# Patient Record
Sex: FEMALE | Race: WHITE | Employment: FULL TIME | URBAN - METROPOLITAN AREA
[De-identification: names, ages, dates, MRNs, and addresses within clinical notes are randomized per-mention and may not be internally consistent; named-entity substitution may affect disease eponyms.]

---

## 2014-08-26 LAB — HIV AG/AB: NONREACTIVE

## 2018-03-16 ENCOUNTER — HOSPITAL ENCOUNTER (OUTPATIENT)
Age: 38
Setting detail: SPECIMEN
Discharge: HOME | End: 2018-03-16
Payer: COMMERCIAL

## 2018-03-16 LAB
DIRECT EXAM: NORMAL
Lab: NORMAL
SPECIMEN DESCRIPTION: NORMAL
STATUS: NORMAL

## 2018-08-05 ENCOUNTER — OFFICE VISIT (OUTPATIENT)
Dept: FAMILY MEDICINE CLINIC | Age: 38
End: 2018-08-05
Payer: COMMERCIAL

## 2018-08-05 VITALS
DIASTOLIC BLOOD PRESSURE: 66 MMHG | HEART RATE: 87 BPM | SYSTOLIC BLOOD PRESSURE: 104 MMHG | TEMPERATURE: 98.8 F | OXYGEN SATURATION: 95 % | BODY MASS INDEX: 42.74 KG/M2 | HEIGHT: 68 IN | WEIGHT: 282 LBS

## 2018-08-05 DIAGNOSIS — L02.411 ABSCESS OF AXILLA, RIGHT: Primary | ICD-10-CM

## 2018-08-05 DIAGNOSIS — L02.91 ENCOUNTER FOR DRAINAGE OF ABSCESS: ICD-10-CM

## 2018-08-05 PROCEDURE — 99204 OFFICE O/P NEW MOD 45 MIN: CPT | Performed by: NURSE PRACTITIONER

## 2018-08-05 RX ORDER — LIRAGLUTIDE 6 MG/ML
3 INJECTION, SOLUTION SUBCUTANEOUS DAILY
COMMUNITY
Start: 2018-06-29 | End: 2019-01-21

## 2018-08-05 RX ORDER — LEVOTHYROXINE SODIUM 0.12 MG/1
1 TABLET ORAL DAILY
COMMUNITY
Start: 2018-06-29

## 2018-08-05 RX ORDER — LIDOCAINE HYDROCHLORIDE 10 MG/ML
1 INJECTION, SOLUTION EPIDURAL; INFILTRATION; INTRACAUDAL; PERINEURAL ONCE
Status: COMPLETED | OUTPATIENT
Start: 2018-08-05 | End: 2018-08-05

## 2018-08-05 RX ADMIN — LIDOCAINE HYDROCHLORIDE 1 ML: 10 INJECTION, SOLUTION EPIDURAL; INFILTRATION; INTRACAUDAL; PERINEURAL at 13:34

## 2018-08-05 NOTE — PROGRESS NOTES
Lewis Robertson, FN-C    Esperanza Oliva is a 45 y.o. female who is here with c/o of   Chief Complaint   Patient presents with    Cyst     in rt axila - noticed it on Friday       Patient Accompanied by: patient    HPI - Esperanza Oliva is here today with c/o lesion to her right arm pit. She states that she noticed it 3-4 days ago. She denies associated symptoms including pain, fever/chills, redness, itching. She has tried warm compresses. They have not helped. She has had a similar problem in the past with a lesion on her inner thigh that needed to be drained. She states that she is leaving to go to [facility] and doesn't want to be away from home with a problem. There are no aggravating or relieving factors. Patient Active Problem List:     Hypothyroidism     Lumbago     Displacement of lumbar intervertebral disc without myelopathy     Past Medical History:   Diagnosis Date    Thyroid disease       Past Surgical History:   Procedure Laterality Date    KNEE SURGERY      NERVE BLOCK  01-13-15    caudal # 1,decadron 10 mg    NERVE BLOCK  1-20-15    caudal epidural steroid block #2 decadron 10 mg    NERVE BLOCK  1/27/15    caudal #3, decadron 10mg     No family history on file. Current Outpatient Prescriptions   Medication Sig Dispense Refill    levothyroxine (SYNTHROID) 125 MCG tablet Take 1 tablet by mouth daily      SAXENDA 18 MG/3ML SOPN Inject 3 mg as directed daily      SPRINTEC 28 0.25-35 MG-MCG per tablet       Multiple Vitamins-Minerals (MULTI COMPLETE PO) Take 1 tablet by mouth daily.  ibuprofen (ADVIL;MOTRIN) 800 MG tablet Take 800 mg by mouth every 6 hours as needed for Pain. No current facility-administered medications for this visit. ALLERGIES:  No Known Allergies    Social History   Substance Use Topics    Smoking status: Never Smoker    Smokeless tobacco: Never Used    Alcohol use No      Comment: socially      Body mass index is 42.88 kg/m².   /66 (Site: Left Arm, Position: Sitting, Cuff Size: Medium Adult)   Pulse 87   Temp 98.8 °F (37.1 °C) (Oral)   Ht 5' 8\" (1.727 m)   Wt 282 lb (127.9 kg)   LMP 07/11/2018 (Approximate)   SpO2 95%   BMI 42.88 kg/m²     Subjective     · Constitutional:  Negative for activity change, appetite change, chills, fatigue, fever and unexpected weight change. · HENT: Negative for congestion, ear pain, rhinorrhea, sinus pain, sinus pressure and sore throat. · Eyes:  Negative for pain and discharge. · Respiratory:  Negative for cough, chest tightness, shortness of breath and wheezing. · Cardiovascular:  Negative for chest pain, palpitations and leg swelling. · Gastrointestinal: Negative for abdominal pain, blood in stool, constipation,diarrhea, nausea and vomiting. · Endocrine: Negative for cold intolerance, heat intolerance, polydipsia, polyphagia and polyuria. · Genitourinary: Negative for difficulty urinating, dysuria, flank pain, frequency, hematuria and urgency. · Musculoskeletal: Negative for arthralgias, back pain, joint swelling, myalgias, neck pain and neck stiffness. · Skin: Negative for rash and wound. POSITIVE skin lesion right arm pit  · Allergic/Immunologic: Negative for environmental allergies and food allergies. · Neurological:  Negative for dizziness, light-headedness, numbness and headaches. · Hematological:  Negative for adenopathy. Does not bruise/bleed easily. · Psychiatric/Behavioral: Negative for self-injury, sleep disturbance and suicidal ideas. Objective     PHYSICAL EXAM:   · Constitutional: Nora Reyes is oriented to person, place, and time. Vital signs are normal. Appears well-developed and well-nourished. · HEENT:   · Head: Normocephalic and atraumatic. Right Ear: Hearing and external ear normal.   Left Ear: Hearing and external ear normal.   · Nose: Nose normal.   · Eyes:PERRL, EOMI, Conjunctiva normal, No discharge.     · Neck: Trachea normal, full passive range of adhesive bandage  · Advised her to use warm compresses to keep the area draining  · Advised her to f/u with primary if she notices signs/symptoms of infection (redness, swelling, heat)    1. Donte Dumont received counseling on the following healthy behaviors: nutrition, exercise and medication adherence  2. Patient given educational materials - see patient instructions  3. Was a self-tracking handout given in paper form or via [facility]? No  If yes, see orders or list here. 4.  Discussed use, benefit, and side effects of prescribed medications. Barriers to medication compliance addressed. All patient questions answered. Pt voiced understanding. 5.  Reviewed prior labs, imaging, consultation, follow up, and health maintenance  6. Continue current medications, diet and exercise. 7. Discussed use, benefit, and side effects of prescribed medications. Barriers to medication compliance addressed. All her questions were answered. Pt voiced understanding. Donte Dumont will continue current medications, diet and exercise. No Follow-up on file. No orders of the defined types were placed in this encounter. Completed Orders/Prescriptions   No orders of the defined types were placed in this encounter. Outpatient Encounter Prescriptions as of 8/5/2018   Medication Sig Dispense Refill    levothyroxine (SYNTHROID) 125 MCG tablet Take 1 tablet by mouth daily      SAXENDA 18 MG/3ML SOPN Inject 3 mg as directed daily      [DISCONTINUED] levothyroxine (SYNTHROID) 100 MCG tablet TAKE ONE TABLET BY MOUTH DAILY 30 tablet 0    SPRINTEC 28 0.25-35 MG-MCG per tablet       Multiple Vitamins-Minerals (MULTI COMPLETE PO) Take 1 tablet by mouth daily.  ibuprofen (ADVIL;MOTRIN) 800 MG tablet Take 800 mg by mouth every 6 hours as needed for Pain.       [DISCONTINUED] levothyroxine (SYNTHROID) 100 MCG tablet TAKE ONE TABLET BY MOUTH EVERY DAY 30 tablet 5     No facility-administered encounter medications on file as of

## 2019-01-21 ENCOUNTER — OFFICE VISIT (OUTPATIENT)
Dept: FAMILY MEDICINE CLINIC | Age: 39
End: 2019-01-21
Payer: COMMERCIAL

## 2019-01-21 VITALS
TEMPERATURE: 97.8 F | HEART RATE: 89 BPM | HEIGHT: 68 IN | BODY MASS INDEX: 43.35 KG/M2 | OXYGEN SATURATION: 99 % | WEIGHT: 286 LBS | RESPIRATION RATE: 18 BRPM | DIASTOLIC BLOOD PRESSURE: 66 MMHG | SYSTOLIC BLOOD PRESSURE: 110 MMHG

## 2019-01-21 DIAGNOSIS — J40 BRONCHITIS: Primary | ICD-10-CM

## 2019-01-21 PROCEDURE — 99214 OFFICE O/P EST MOD 30 MIN: CPT | Performed by: NURSE PRACTITIONER

## 2019-01-21 RX ORDER — PHENTERMINE HYDROCHLORIDE 37.5 MG/1
TABLET ORAL
COMMUNITY
Start: 2019-01-04 | End: 2022-03-15 | Stop reason: ALTCHOICE

## 2019-01-21 RX ORDER — AZITHROMYCIN 250 MG/1
TABLET, FILM COATED ORAL
Qty: 1 PACKET | Refills: 0 | Status: SHIPPED | OUTPATIENT
Start: 2019-01-21 | End: 2022-03-15 | Stop reason: ALTCHOICE

## 2019-01-21 RX ORDER — PREDNISONE 20 MG/1
40 TABLET ORAL DAILY
Qty: 10 TABLET | Refills: 0 | Status: SHIPPED | OUTPATIENT
Start: 2019-01-21 | End: 2019-01-26

## 2019-01-21 ASSESSMENT — ENCOUNTER SYMPTOMS
CHEST TIGHTNESS: 0
EYE REDNESS: 0
SINUS PRESSURE: 0
EYE DISCHARGE: 0
SHORTNESS OF BREATH: 0
VOICE CHANGE: 0
COUGH: 1
WHEEZING: 1
SORE THROAT: 1

## 2020-05-02 ENCOUNTER — OFFICE VISIT (OUTPATIENT)
Dept: FAMILY MEDICINE CLINIC | Age: 40
End: 2020-05-02
Payer: COMMERCIAL

## 2020-05-02 VITALS
HEART RATE: 79 BPM | OXYGEN SATURATION: 97 % | RESPIRATION RATE: 16 BRPM | DIASTOLIC BLOOD PRESSURE: 78 MMHG | SYSTOLIC BLOOD PRESSURE: 130 MMHG

## 2020-05-02 PROCEDURE — 99213 OFFICE O/P EST LOW 20 MIN: CPT | Performed by: PHYSICIAN ASSISTANT

## 2020-05-02 RX ORDER — IBUPROFEN 800 MG/1
800 TABLET ORAL EVERY 6 HOURS PRN
Qty: 28 TABLET | Refills: 0 | Status: SHIPPED | OUTPATIENT
Start: 2020-05-02 | End: 2020-05-09

## 2020-05-02 RX ORDER — PREDNISONE 20 MG/1
20 TABLET ORAL 2 TIMES DAILY
Qty: 10 TABLET | Refills: 0 | Status: SHIPPED | OUTPATIENT
Start: 2020-05-02 | End: 2020-05-07

## 2020-05-02 NOTE — PATIENT INSTRUCTIONS
you can put on your leg. Use a cane, crutches, or a walker as instructed. · Follow your doctor's instructions about activity during your healing process. If you can do mild exercise, slowly increase your activity. · Reach and stay at a healthy weight. Extra weight can strain the joints, especially the knees and hips, and make the pain worse. Losing even a few pounds may help. When should you call for help? Call 911 anytime you think you may need emergency care. For example, call if:    · You have symptoms of a blood clot in your lung (called a pulmonary embolism). These may include:  ? Sudden chest pain. ? Trouble breathing. ? Coughing up blood.    Call your doctor now or seek immediate medical care if:    · You have severe or increasing pain.     · Your leg or foot turns cold or changes color.     · You cannot stand or put weight on your knee.     · Your knee looks twisted or bent out of shape.     · You cannot move your knee.     · You have signs of infection, such as:  ? Increased pain, swelling, warmth, or redness. ? Red streaks leading from the knee. ? Pus draining from a place on your knee. ? A fever.     · You have signs of a blood clot in your leg (called a deep vein thrombosis), such as:  ? Pain in your calf, back of the knee, thigh, or groin. ? Redness and swelling in your leg or groin.    Watch closely for changes in your health, and be sure to contact your doctor if:    · You have tingling, weakness, or numbness in your knee.     · You have any new symptoms, such as swelling.     · You have bruises from a knee injury that last longer than 2 weeks.     · You do not get better as expected. Where can you learn more? Go to [email or web address] org and sign in to your [employer] account. Enter K195 in the [employer] box to learn more about \"Knee Pain or Injury: Care Instructions. \"     If you do not have an account, please click on the \"Sign Up Now\" link.   Current as of:

## 2020-05-02 NOTE — PROGRESS NOTES
of the review of systems was reviewed andnegative. PAST MEDICAL HISTORY   History reviewed. Past Medical History:   Diagnosis Date    Thyroid disease          SURGICAL HISTORY     History reviewed. Past Surgical History:   Procedure Laterality Date    KNEE SURGERY      NERVE BLOCK  01-13-15    caudal # 1,decadron 10 mg    NERVE BLOCK  1-20-15    caudal epidural steroid block #2 decadron 10 mg    NERVE BLOCK  1/27/15    caudal #3, decadron 10mg         CURRENT MEDICATIONS       Current Outpatient Medications   Medication Sig Dispense Refill    ibuprofen (ADVIL;MOTRIN) 800 MG tablet Take 1 tablet by mouth every 6 hours as needed for Pain 28 tablet 0    predniSONE (DELTASONE) 20 MG tablet Take 1 tablet by mouth 2 times daily for 5 days 10 tablet 0    levothyroxine (SYNTHROID) 125 MCG tablet Take 1 tablet by mouth daily      SPRINTEC 28 0.25-35 MG-MCG per tablet       Multiple Vitamins-Minerals (MULTI COMPLETE PO) Take 1 tablet by mouth daily.  phentermine (ADIPEX-P) 37.5 MG tablet       azithromycin (ZITHROMAX Z-JOSE) 250 MG tablet Take 2 tabs on day 1 followed by 1 tab on days 2-5. (Patient not taking: Reported on 5/2/2020) 1 packet 0     No current facility-administered medications for this visit. ALLERGIES     Patient has no known allergies. FAMILY HISTORY           Problem Relation Age of Onset    No Known Problems Mother      Family Status   Relation Name Status    Mother  Alive          SOCIAL HISTORY      reports that she has never smoked. She has never used smokeless tobacco. She reports that she does not drink alcohol or use drugs. PHYSICAL EXAM    (up to 7 for level 4, 8 or more for level 5)     Vitals:    05/02/20 1519   BP: 130/78   Pulse: 79   Resp: 16   SpO2: 97%         Physical Exam  Vitals signs and nursing note reviewed. Constitutional:       General: She is not in acute distress. Appearance: Normal appearance.  She is not ill-appearing, Custom]   - Future Order         ORDERS WITHOUT AN ASSOCIATED DIAGNOSIS    ibuprofen (ADVIL;MOTRIN) 800 MG tablet [3845]      predniSONE (DELTASONE) 20 MG tablet [6496]              PLAN     Return if symptoms worsen or fail to improve. DISCHARGEMEDICATIONS:  Orders Placed This Encounter   Medications    ibuprofen (ADVIL;MOTRIN) 800 MG tablet     Sig: Take 1 tablet by mouth every 6 hours as needed for Pain     Dispense:  28 tablet     Refill:  0    predniSONE (DELTASONE) 20 MG tablet     Sig: Take 1 tablet by mouth 2 times daily for 5 days     Dispense:  10 tablet     Refill:  0         Plan:  1. RICE Therapy  2. Tylenol/Motrin for pain as needed  3. Follow up with PCP   4. X-ray as ordered - possible treatment alteration based on results. Patient instructed to return to the office if symptoms worsen, return, or have any other concerns. Patient understands and is agreeable.          Melodie Jeronimo PA-C 5/6/2020 10:03 AM

## 2020-05-06 ASSESSMENT — ENCOUNTER SYMPTOMS
GASTROINTESTINAL NEGATIVE: 1
RESPIRATORY NEGATIVE: 1

## 2022-03-15 ENCOUNTER — ANESTHESIA EVENT (OUTPATIENT)
Dept: OPERATING ROOM | Age: 42
DRG: 418 | End: 2022-03-15
Payer: COMMERCIAL

## 2022-03-15 ENCOUNTER — APPOINTMENT (OUTPATIENT)
Dept: MRI IMAGING | Age: 42
DRG: 418 | End: 2022-03-15
Payer: COMMERCIAL

## 2022-03-15 ENCOUNTER — HOSPITAL ENCOUNTER (INPATIENT)
Age: 42
LOS: 1 days | Discharge: HOME | DRG: 418 | End: 2022-03-16
Attending: EMERGENCY MEDICINE | Admitting: FAMILY MEDICINE
Payer: COMMERCIAL

## 2022-03-15 ENCOUNTER — APPOINTMENT (OUTPATIENT)
Dept: ULTRASOUND IMAGING | Age: 42
DRG: 418 | End: 2022-03-15
Payer: COMMERCIAL

## 2022-03-15 DIAGNOSIS — K81.0 ACUTE CHOLECYSTITIS: Primary | ICD-10-CM

## 2022-03-15 DIAGNOSIS — K80.01 CALCULUS OF GALLBLADDER WITH ACUTE CHOLECYSTITIS AND OBSTRUCTION: ICD-10-CM

## 2022-03-15 PROBLEM — R79.89 ELEVATED LFTS: Status: ACTIVE | Noted: 2022-03-15

## 2022-03-15 LAB
-: ABNORMAL
ABSOLUTE EOS #: 0.05 K/UL (ref 0–0.44)
ABSOLUTE IMMATURE GRANULOCYTE: 0.05 K/UL (ref 0–0.3)
ABSOLUTE LYMPH #: 2.54 K/UL (ref 1.1–3.7)
ABSOLUTE MONO #: 0.93 K/UL (ref 0.1–1.2)
ALBUMIN SERPL-MCNC: 4.3 G/DL (ref 3.5–5.2)
ALP BLD-CCNC: 156 U/L (ref 35–104)
ALT SERPL-CCNC: 211 U/L (ref 5–33)
AMORPHOUS: ABNORMAL
ANION GAP SERPL CALCULATED.3IONS-SCNC: 13 MMOL/L (ref 9–17)
AST SERPL-CCNC: 272 U/L
BACTERIA: ABNORMAL
BASOPHILS # BLD: 1 % (ref 0–2)
BASOPHILS ABSOLUTE: 0.05 K/UL (ref 0–0.2)
BILIRUB SERPL-MCNC: 1.91 MG/DL (ref 0.3–1.2)
BILIRUBIN DIRECT: 1.3 MG/DL
BILIRUBIN URINE: ABNORMAL
BILIRUBIN, INDIRECT: 0.61 MG/DL (ref 0–1)
BUN BLDV-MCNC: 18 MG/DL (ref 6–20)
BUN/CREAT BLD: 22 (ref 9–20)
CALCIUM SERPL-MCNC: 9.3 MG/DL (ref 8.6–10.4)
CHLORIDE BLD-SCNC: 103 MMOL/L (ref 98–107)
CO2: 23 MMOL/L (ref 20–31)
COLOR: ABNORMAL
CREAT SERPL-MCNC: 0.82 MG/DL (ref 0.5–0.9)
EOSINOPHILS RELATIVE PERCENT: 1 % (ref 1–4)
EPITHELIAL CELLS UA: ABNORMAL /HPF (ref 0–5)
GFR AFRICAN AMERICAN: >60 ML/MIN
GFR NON-AFRICAN AMERICAN: >60 ML/MIN
GFR SERPL CREATININE-BSD FRML MDRD: ABNORMAL ML/MIN/{1.73_M2}
GLUCOSE BLD-MCNC: 112 MG/DL (ref 70–99)
GLUCOSE BLD-MCNC: 89 MG/DL (ref 65–105)
GLUCOSE URINE: NEGATIVE
HAV IGM SER IA-ACNC: NONREACTIVE
HCG QUALITATIVE: NEGATIVE
HCT VFR BLD CALC: 41 % (ref 36.3–47.1)
HEMOGLOBIN: 13.5 G/DL (ref 11.9–15.1)
HEPATITIS B CORE IGM ANTIBODY: NONREACTIVE
HEPATITIS B SURFACE ANTIGEN: NONREACTIVE
HEPATITIS C ANTIBODY: NONREACTIVE
IMMATURE GRANULOCYTES: 1 %
KETONES, URINE: NEGATIVE
LEUKOCYTE ESTERASE, URINE: NEGATIVE
LIPASE: 27 U/L (ref 13–60)
LYMPHOCYTES # BLD: 24 % (ref 24–43)
MCH RBC QN AUTO: 29.2 PG (ref 25.2–33.5)
MCHC RBC AUTO-ENTMCNC: 32.9 G/DL (ref 28.4–34.8)
MCV RBC AUTO: 88.6 FL (ref 82.6–102.9)
MONOCYTES # BLD: 9 % (ref 3–12)
NITRITE, URINE: NEGATIVE
NRBC AUTOMATED: 0 PER 100 WBC
PDW BLD-RTO: 12 % (ref 11.8–14.4)
PH UA: 6.5 (ref 5–8)
PLATELET # BLD: 272 K/UL (ref 138–453)
PMV BLD AUTO: 9.9 FL (ref 8.1–13.5)
POTASSIUM SERPL-SCNC: 4 MMOL/L (ref 3.7–5.3)
PROTEIN UA: NEGATIVE
RBC # BLD: 4.63 M/UL (ref 3.95–5.11)
RBC UA: ABNORMAL /HPF (ref 0–2)
SEG NEUTROPHILS: 64 % (ref 36–65)
SEGMENTED NEUTROPHILS ABSOLUTE COUNT: 6.96 K/UL (ref 1.5–8.1)
SODIUM BLD-SCNC: 139 MMOL/L (ref 135–144)
SPECIFIC GRAVITY UA: 1.02 (ref 1–1.03)
TOTAL PROTEIN: 6.7 G/DL (ref 6.4–8.3)
TURBIDITY: ABNORMAL
URINE HGB: NEGATIVE
UROBILINOGEN, URINE: NORMAL
WBC # BLD: 10.6 K/UL (ref 3.5–11.3)
WBC UA: ABNORMAL /HPF (ref 0–5)

## 2022-03-15 PROCEDURE — 2580000003 HC RX 258: Performed by: FAMILY MEDICINE

## 2022-03-15 PROCEDURE — G0378 HOSPITAL OBSERVATION PER HR: HCPCS

## 2022-03-15 PROCEDURE — 96365 THER/PROPH/DIAG IV INF INIT: CPT

## 2022-03-15 PROCEDURE — 83690 ASSAY OF LIPASE: CPT

## 2022-03-15 PROCEDURE — 2580000003 HC RX 258: Performed by: EMERGENCY MEDICINE

## 2022-03-15 PROCEDURE — 85025 COMPLETE CBC W/AUTO DIFF WBC: CPT

## 2022-03-15 PROCEDURE — 76705 ECHO EXAM OF ABDOMEN: CPT

## 2022-03-15 PROCEDURE — 6360000002 HC RX W HCPCS: Performed by: EMERGENCY MEDICINE

## 2022-03-15 PROCEDURE — 84703 CHORIONIC GONADOTROPIN ASSAY: CPT

## 2022-03-15 PROCEDURE — 80048 BASIC METABOLIC PNL TOTAL CA: CPT

## 2022-03-15 PROCEDURE — 81001 URINALYSIS AUTO W/SCOPE: CPT

## 2022-03-15 PROCEDURE — 96375 TX/PRO/DX INJ NEW DRUG ADDON: CPT

## 2022-03-15 PROCEDURE — 2580000003 HC RX 258: Performed by: STUDENT IN AN ORGANIZED HEALTH CARE EDUCATION/TRAINING PROGRAM

## 2022-03-15 PROCEDURE — 82947 ASSAY GLUCOSE BLOOD QUANT: CPT

## 2022-03-15 PROCEDURE — 99222 1ST HOSP IP/OBS MODERATE 55: CPT | Performed by: INTERNAL MEDICINE

## 2022-03-15 PROCEDURE — 80076 HEPATIC FUNCTION PANEL: CPT

## 2022-03-15 PROCEDURE — 96374 THER/PROPH/DIAG INJ IV PUSH: CPT

## 2022-03-15 PROCEDURE — 96372 THER/PROPH/DIAG INJ SC/IM: CPT

## 2022-03-15 PROCEDURE — 80074 ACUTE HEPATITIS PANEL: CPT

## 2022-03-15 PROCEDURE — 6360000002 HC RX W HCPCS: Performed by: FAMILY MEDICINE

## 2022-03-15 PROCEDURE — 1200000000 HC SEMI PRIVATE

## 2022-03-15 PROCEDURE — 74181 MRI ABDOMEN W/O CONTRAST: CPT

## 2022-03-15 PROCEDURE — 99283 EMERGENCY DEPT VISIT LOW MDM: CPT

## 2022-03-15 PROCEDURE — 6360000002 HC RX W HCPCS: Performed by: STUDENT IN AN ORGANIZED HEALTH CARE EDUCATION/TRAINING PROGRAM

## 2022-03-15 RX ORDER — SODIUM CHLORIDE 0.9 % (FLUSH) 0.9 %
5-40 SYRINGE (ML) INJECTION EVERY 12 HOURS SCHEDULED
Status: DISCONTINUED | OUTPATIENT
Start: 2022-03-15 | End: 2022-03-15 | Stop reason: SDUPTHER

## 2022-03-15 RX ORDER — SODIUM CHLORIDE 9 MG/ML
25 INJECTION, SOLUTION INTRAVENOUS PRN
Status: DISCONTINUED | OUTPATIENT
Start: 2022-03-15 | End: 2022-03-16 | Stop reason: HOSPADM

## 2022-03-15 RX ORDER — POLYETHYLENE GLYCOL 3350 17 G/17G
17 POWDER, FOR SOLUTION ORAL DAILY PRN
Status: DISCONTINUED | OUTPATIENT
Start: 2022-03-15 | End: 2022-03-16

## 2022-03-15 RX ORDER — MORPHINE SULFATE 4 MG/ML
4 INJECTION, SOLUTION INTRAMUSCULAR; INTRAVENOUS EVERY 4 HOURS PRN
Status: DISCONTINUED | OUTPATIENT
Start: 2022-03-15 | End: 2022-03-16 | Stop reason: HOSPADM

## 2022-03-15 RX ORDER — MAGNESIUM SULFATE 1 G/100ML
1000 INJECTION INTRAVENOUS PRN
Status: DISCONTINUED | OUTPATIENT
Start: 2022-03-15 | End: 2022-03-16 | Stop reason: HOSPADM

## 2022-03-15 RX ORDER — SODIUM CHLORIDE 0.9 % (FLUSH) 0.9 %
10 SYRINGE (ML) INJECTION PRN
Status: DISCONTINUED | OUTPATIENT
Start: 2022-03-15 | End: 2022-03-16 | Stop reason: HOSPADM

## 2022-03-15 RX ORDER — SODIUM CHLORIDE 9 MG/ML
INJECTION, SOLUTION INTRAVENOUS CONTINUOUS
Status: DISCONTINUED | OUTPATIENT
Start: 2022-03-15 | End: 2022-03-16

## 2022-03-15 RX ORDER — ONDANSETRON 4 MG/1
4 TABLET, ORALLY DISINTEGRATING ORAL EVERY 8 HOURS PRN
Status: DISCONTINUED | OUTPATIENT
Start: 2022-03-15 | End: 2022-03-16 | Stop reason: HOSPADM

## 2022-03-15 RX ORDER — POTASSIUM CHLORIDE 20 MEQ/1
40 TABLET, EXTENDED RELEASE ORAL PRN
Status: DISCONTINUED | OUTPATIENT
Start: 2022-03-15 | End: 2022-03-16 | Stop reason: HOSPADM

## 2022-03-15 RX ORDER — MORPHINE SULFATE 4 MG/ML
4 INJECTION, SOLUTION INTRAMUSCULAR; INTRAVENOUS ONCE
Status: COMPLETED | OUTPATIENT
Start: 2022-03-15 | End: 2022-03-15

## 2022-03-15 RX ORDER — SODIUM CHLORIDE 0.9 % (FLUSH) 0.9 %
5-40 SYRINGE (ML) INJECTION EVERY 12 HOURS SCHEDULED
Status: DISCONTINUED | OUTPATIENT
Start: 2022-03-15 | End: 2022-03-16 | Stop reason: HOSPADM

## 2022-03-15 RX ORDER — SODIUM CHLORIDE 9 MG/ML
25 INJECTION, SOLUTION INTRAVENOUS PRN
Status: DISCONTINUED | OUTPATIENT
Start: 2022-03-15 | End: 2022-03-15 | Stop reason: SDUPTHER

## 2022-03-15 RX ORDER — ONDANSETRON 2 MG/ML
4 INJECTION INTRAMUSCULAR; INTRAVENOUS ONCE
Status: COMPLETED | OUTPATIENT
Start: 2022-03-15 | End: 2022-03-15

## 2022-03-15 RX ORDER — CHLORAL HYDRATE 500 MG
1000 CAPSULE ORAL DAILY
COMMUNITY

## 2022-03-15 RX ORDER — CHOLECALCIFEROL (VITAMIN D3) 1250 MCG
1 CAPSULE ORAL DAILY
COMMUNITY

## 2022-03-15 RX ORDER — VITAMIN B COMPLEX
1 CAPSULE ORAL DAILY
COMMUNITY

## 2022-03-15 RX ORDER — ONDANSETRON 2 MG/ML
4 INJECTION INTRAMUSCULAR; INTRAVENOUS EVERY 6 HOURS PRN
Status: DISCONTINUED | OUTPATIENT
Start: 2022-03-15 | End: 2022-03-16 | Stop reason: HOSPADM

## 2022-03-15 RX ORDER — POTASSIUM CHLORIDE 7.45 MG/ML
10 INJECTION INTRAVENOUS PRN
Status: DISCONTINUED | OUTPATIENT
Start: 2022-03-15 | End: 2022-03-16 | Stop reason: HOSPADM

## 2022-03-15 RX ORDER — ACETAMINOPHEN 650 MG/1
650 SUPPOSITORY RECTAL EVERY 6 HOURS PRN
Status: DISCONTINUED | OUTPATIENT
Start: 2022-03-15 | End: 2022-03-16 | Stop reason: HOSPADM

## 2022-03-15 RX ORDER — SODIUM CHLORIDE 0.9 % (FLUSH) 0.9 %
5-40 SYRINGE (ML) INJECTION PRN
Status: DISCONTINUED | OUTPATIENT
Start: 2022-03-15 | End: 2022-03-15 | Stop reason: SDUPTHER

## 2022-03-15 RX ORDER — ACETAMINOPHEN 325 MG/1
650 TABLET ORAL EVERY 6 HOURS PRN
Status: DISCONTINUED | OUTPATIENT
Start: 2022-03-15 | End: 2022-03-16 | Stop reason: HOSPADM

## 2022-03-15 RX ADMIN — SODIUM CHLORIDE, PRESERVATIVE FREE 10 ML: 5 INJECTION INTRAVENOUS at 23:06

## 2022-03-15 RX ADMIN — PIPERACILLIN AND TAZOBACTAM 3375 MG: 3; .375 INJECTION, POWDER, LYOPHILIZED, FOR SOLUTION INTRAVENOUS at 23:56

## 2022-03-15 RX ADMIN — PIPERACILLIN AND TAZOBACTAM 4500 MG: 4; .5 INJECTION, POWDER, LYOPHILIZED, FOR SOLUTION INTRAVENOUS at 10:32

## 2022-03-15 RX ADMIN — MORPHINE SULFATE 4 MG: 4 INJECTION, SOLUTION INTRAMUSCULAR; INTRAVENOUS at 06:33

## 2022-03-15 RX ADMIN — PIPERACILLIN AND TAZOBACTAM 3375 MG: 3; .375 INJECTION, POWDER, LYOPHILIZED, FOR SOLUTION INTRAVENOUS at 16:20

## 2022-03-15 RX ADMIN — ONDANSETRON 4 MG: 2 INJECTION INTRAMUSCULAR; INTRAVENOUS at 06:31

## 2022-03-15 RX ADMIN — SODIUM CHLORIDE: 9 INJECTION, SOLUTION INTRAVENOUS at 22:58

## 2022-03-15 RX ADMIN — SODIUM CHLORIDE 25 ML: 9 INJECTION, SOLUTION INTRAVENOUS at 11:12

## 2022-03-15 RX ADMIN — ENOXAPARIN SODIUM 30 MG: 30 INJECTION SUBCUTANEOUS at 22:58

## 2022-03-15 ASSESSMENT — PAIN DESCRIPTION - ONSET: ONSET: ON-GOING

## 2022-03-15 ASSESSMENT — PAIN DESCRIPTION - PROGRESSION: CLINICAL_PROGRESSION: GRADUALLY IMPROVING

## 2022-03-15 ASSESSMENT — PAIN DESCRIPTION - LOCATION: LOCATION: ABDOMEN

## 2022-03-15 ASSESSMENT — PAIN DESCRIPTION - PAIN TYPE: TYPE: ACUTE PAIN

## 2022-03-15 ASSESSMENT — PAIN SCALES - GENERAL
PAINLEVEL_OUTOF10: 2
PAINLEVEL_OUTOF10: 9

## 2022-03-15 ASSESSMENT — PAIN - FUNCTIONAL ASSESSMENT: PAIN_FUNCTIONAL_ASSESSMENT: ACTIVITIES ARE NOT PREVENTED

## 2022-03-15 NOTE — CONSULTS
Gastroenterology Consult Note      Patient: Deandre Simmons  : 1980  Acct#:  [de-identified]     Date:  3/15/2022    Subjective:       History of Present Illness  Patient is a 39 y.o.  female admitted with Elevated LFTs [R79.89] who is seen in consult for elevated liver enzyme    This is a 41-year-old lady came to the emergency room complaining of right upper quadrant pain. Pain has been going on for a week. Intermittent sharp. Does not radiate. Pain sometimes after eating. No nausea no vomiting no bleeding no fever no chills 1 time of emesis last night which did not show any blood. Intermittent episode of diarrhea. The patient had elevated liver enzymes. Her gallbladder ultrasound showed cholelithiasis with sludge and enlarged common bile duct. Lipase is normal.  White blood count is normal.  LFTs are showing: Alkaline phosphatase of 156, , , bilirubin 1.9. Denied any other GI symptoms. Endoscopy history none            Past Medical History:   Diagnosis Date    Thyroid disease       Past Surgical History:   Procedure Laterality Date    KNEE SURGERY      NERVE BLOCK  01-13-15    caudal # 1,decadron 10 mg    NERVE BLOCK  1-20-15    caudal epidural steroid block #2 decadron 10 mg    NERVE BLOCK  1/27/15    caudal #3, decadron 10mg      Past Endoscopic History none    Admission Meds  No current facility-administered medications on file prior to encounter. Current Outpatient Medications on File Prior to Encounter   Medication Sig Dispense Refill    levothyroxine (SYNTHROID) 125 MCG tablet Take 1 tablet by mouth daily      SPRINTEC 28 0.25-35 MG-MCG per tablet       Multiple Vitamins-Minerals (MULTI COMPLETE PO) Take 1 tablet by mouth daily.       ibuprofen (ADVIL;MOTRIN) 800 MG tablet Take 1 tablet by mouth every 6 hours as needed for Pain 28 tablet 0       Patient   Does Use ASA, NSAID No  Allergies  No Known Allergies     Social   Social History     Tobacco Use    Smoking status: Never Smoker    Smokeless tobacco: Never Used   Substance Use Topics    Alcohol use: No     Comment: socially        PSYCH HISTORY:  Depression No  Anxiety No  Suicide No       Family History   Problem Relation Age of Onset    No Known Problems Mother       No family history of colon cancer, Crohn's disease, or ulcerative colitis. Review of Systems  Constitutional: negative  Eyes: negative  Ears, nose, mouth, throat, and face: negative  Respiratory: negative  Cardiovascular: negative  Gastrointestinal: negative  Genitourinary:negative  Integument/breast: negative  Hematologic/lymphatic: negative  Musculoskeletal:negative  Endocrine: negative           Physical Exam  Blood pressure (!) 155/97, pulse 78, temperature 98.4 °F (36.9 °C), resp. rate 16, height 5' 8\" (1.727 m), weight 275 lb 6.4 oz (124.9 kg), SpO2 97 %, not currently breastfeeding.          General Appearance: alert and oriented to person, place and time, well-developed and well-nourished, in no acute distress  Skin: warm and dry, no rash or erythema  Head: normocephalic and atraumatic  Eyes: pupils equal, round, and reactive to light, extraocular eye movements intact, conjunctivae normal  ENT: hearing grossly normal bilaterally  Neck: neck supple and non tender without mass, no thyromegaly or thyroid nodules, no cervical lymphadenopathy   Pulmonary/Chest: clear to auscultation bilaterally- no wheezes, rales or rhonchi, normal air movement, no respiratory distress  Cardiovascular: normal rate, regular rhythm, normal S1 and S2, no murmurs, rubs, clicks or gallops, distal pulses intact, no carotid bruits  Abdomen: soft, non-tender, non-distended, normal bowel sounds, no masses or organomegaly  Extremities: no cyanosis, clubbing or edema  Musculoskeletal: normal range of motion, no joint swelling, deformity or tenderness  Neurologic: no cranial nerve deficit and muscle strength normal    Data Review:    Recent Labs 03/15/22  0629   WBC 10.6   HGB 13.5   HCT 41.0   MCV 88.6        Recent Labs     03/15/22  0629      K 4.0      CO2 23   BUN 18   CREATININE 0.82     Recent Labs     03/15/22  0629   *   *   BILIDIR 1.30*   BILITOT 1.91*   ALKPHOS 156*     Recent Labs     03/15/22  0629   LIPASE 27     No results for input(s): PROTIME, INR in the last 72 hours. No results for input(s): PTT in the last 72 hours. No results for input(s): OCCULTBLD in the last 72 hours. CEA:  No results found for: CEA  Ca 125:  No results found for:   Ca 19-9:  No results found for:   Ca 15-3:  No results found for:   AFP:  No components found for: AFAFP  Beta HCG:  No components found for: BHCG  Neuron Specific Enolase:  No results found for: NSE  Imaging Studies:                           All appropriate imaging studies and reports reviewed: Yes                 Assessment:     Principal Problem:    Elevated LFTs  Resolved Problems:    * No resolved hospital problems. *  Abdominal pain  Enzymes  Cholelithiasis dilated ducts rule out choledocholithiasis      Recommendations:   MRCP based on results ERCP  Acute hepatitis panel  Trend LFTs  IV fluid  Symptomatic treatment                      Thank you for allowing me to participate in the care of your patient. Please feel free to contact me with any questions or concerns.      Cherri Christianson MD

## 2022-03-15 NOTE — PROGRESS NOTES
Transitions of Care Pharmacy Service   Medication Review    The patient's list of current home medications has been reviewed. Source(s) of information: Surescripts, Patient     Based on information provided by the above source(s), I have updated the patient's home med list as described below. Please review the ACTION REQUESTED section of this note for any discrepancies on current hospital orders. I changed or updated the following medications on the patient's home medication list:  Discontinued None     Added Vitamin B complex caps PO QD  Vitamin D 1.2 mg caps PO QD  Fish oil 1000 mg caps PO QD  Magnesium Malate 100 mg cap PO QD      Adjusted   Sprintec 28 0.25-35 mg-mcg tab PO QD          Please feel free to call me with any questions about this encounter. Thank you. This note will be reviewed and co-signed by the Transitions of Care Pharmacist.    Omar England PharmD student  Transitions of Care Pharmacy Service  Phone:  [phone]  Fax: [phone]      Electronically signed by Omar England on 3/15/2022 at 3:52 PM     Prior to Admission medications    Medication Sig Start Date End Date Taking? Authorizing Provider   b complex vitamins capsule Take 1 capsule by mouth daily   Yes Historical Provider, MD   Cholecalciferol (VITAMIN D3) 1.25 MG (99589 UT) CAPS Take 1 capsule by mouth daily   Yes Historical Provider, MD   levothyroxine (SYNTHROID) 125 MCG tablet Take 1 tablet by mouth daily 6/29/18  Yes Historical Provider, MD   [facility] 28 0.25-35 MG-MCG per tablet Take 1 tablet by mouth daily  12/26/14  Yes Historical Provider, MD   Multiple Vitamins-Minerals (MULTI COMPLETE PO) Take 1 tablet by mouth daily.    Yes Historical Provider, MD   ibuprofen (ADVIL;MOTRIN) 800 MG tablet Take 1 tablet by mouth every 6 hours as needed for Pain 5/2/20 5/9/20  Daria Mota PA-C

## 2022-03-15 NOTE — ED NOTES
Pt presents to ED via private auto with c/o upper abdominal pain, onset 2100 last night. Pt states she had similar pain last week. Pt alert and oriented x4. Pt able to ambulate without assist. Pt afebrile, vitals stable. Pt denies emesis. Bowel sounds active in all four quadrants. Pt rates pain 8/10.       David Loyd RN  03/15/22 8001

## 2022-03-15 NOTE — ED PROVIDER NOTES
Medical Decision Making: The patient was initially seen by Dr. Lex Martinez and signed out to me after discussing the case with him thoroughly. Gallbladder ultrasound shows stones and sludge and LFTs are somewhat elevated. ALT is 211 and AST is 272 with a total bilirubin of 1.91. She is being admitted and will likely need further testing and specialty consultation. Treatment diagnosis and disposition were discussed with the patient    US GALLBLADDER RUQ    Result Date: 3/15/2022  EXAMINATION: RIGHT UPPER QUADRANT ULTRASOUND 3/15/2022 6:58 am COMPARISON: None. HISTORY: ORDERING SYSTEM PROVIDED HISTORY: Pain TECHNOLOGIST PROVIDED HISTORY: Pain 27-year-old female with abdominal pain FINDINGS: LIVER:  The liver demonstrates normal echogenicity without evidence of intrahepatic biliary ductal dilatation. Liver length measures 15.4 cm. Color flow projects over the main portal vein with a normal hepatopetal, monophasic waveform. BILIARY SYSTEM:  Cholelithiasis and gallbladder sludge at the gallbladder neck. Gallbladder wall thickness measures 2 mm. Sonographic Yumi Bolingbrook sign is negative. No pericholecystic fluid. Common bile duct is enlarged measuring 6 mm. RIGHT KIDNEY: Right kidney measures 11.4 x 5.1 x 4.9 cm. Right renal cortical thickness measures 1.1 cm. No gross right-sided hydronephrosis. PANCREAS: Suboptimal visualization of the pancreas. Visualized portions of the pancreas are unremarkable. OTHER: No evidence of right upper quadrant ascites. 1. Cholelithiasis/gallbladder sludge. If there is clinical concern for acute cholecystitis, nuclear medicine HIDA scan should be performed. 2. Enlarged common bile duct measuring 6 mm. Further evaluation with MRCP recommended. Correlation with LFTs recommended. RECOMMENDATIONS: Unavailable       CONSULTS:  None    PROCEDURES:  None    FINAL IMPRESSION      1.  Calculus of gallbladder with acute cholecystitis and obstruction         DISPOSITION/PLAN DISPOSITION Decision To Admit 03/15/2022 08:24:36 AM       PATIENT REFERRED TO:   No follow-up provider specified.     DISCHARGE MEDICATIONS:     Current Discharge Medication List            (Please note that portions of this note were completed with a voice recognition program.  Efforts were made to edit the dictations but occasionally words are mis-transcribed.)    Bay Mckinney MD  Attending Emergency Physician         Bay Mckinney MD  03/15/22 9314

## 2022-03-15 NOTE — PLAN OF CARE
organomegaly no ascites  Extremities: no cyanosis, clubbing or edema  Musculoskeletal: normal range of motion, no joint swelling, deformity or tenderness  Neurologic: no cranial nerve deficit and muscle strength normal    Assessment  Abdominal pain with nausea and vomiting  Elevated lft's, cholelithiasis per imaging    Plan  Will d/w md  mrcp abd ordered and based on results may need ercp  Acute hepatitis panel ordered  Recheck lft in am  Pain and nausea mgt  Formal gi consult to follow  . Marquis Grisel Clark, APRN - CNP

## 2022-03-15 NOTE — CONSULTS
General Surgery:    Consult Note     PATIENT NAME: Cici Ang   YOB: 1980    ADMISSION DATE: 3/15/2022  5:49 AM     Consulted Physician: Dr. Bryanna Blanton DATE: 3/15/2022    Chief Complaint:  RUQ abdominal pain   Consult Regarding:  Cholelithiasis     HISTORY OF PRESENT ILLNESS:  The patient is a 39 y.o. female [facility] where she was seen and evaluated due to concerns for right upper quadrant abdominal pain. Patient states the pain is been present for the past week. She states has been intermittently very sharp in nature. It did radiate to the back and epigastric region originally. She had a severe pain episode a few days prior and then again last night after eating dinner. She denies any nausea acutely. She states that she did have a one-time episode of emesis last night. Endorses intermittent episodes of diarrhea. Exam, she is afebrile with stable. She is resting comfortably. Abdomen soft with tenderness of the quadrant without guarding. No leukocytosis or electrolyte abnormalities. LFTs are elevated with alkaline phosphatase 156, , , total bilirubin 1.91 with a direct bilirubin of 1.3. Ultrasound revealed evidence of cholelithiasis with sludge, pericholecystic fluid or thickening. Gallbladder acted measuring 6 mm. However, MRCP was without evidence of choledocholithiasis. No blood thinning medications. No prior abdominal surgeries. Past Medical History:        Diagnosis Date    Thyroid disease        Past Surgical History:        Procedure Laterality Date    KNEE SURGERY      NERVE BLOCK  01-13-15    caudal # 1,decadron 10 mg    NERVE BLOCK  1-20-15    caudal epidural steroid block #2 decadron 10 mg    NERVE BLOCK  1/27/15    caudal #3, decadron 10mg       Medications Prior to Admission: See epic list notesf birth control pills thyroid medication  Not in a hospital admission.     Allergies:  Patient has no known allergies. Social History:   Social History     Socioeconomic History    Marital status: Single     Spouse name: Not on file    Number of children: Not on file    Years of education: Not on file    Highest education level: Not on file   Occupational History    Not on file   Tobacco Use    Smoking status: Never Smoker    Smokeless tobacco: Never Used   Substance and Sexual Activity    Alcohol use: No     Comment: socially    Drug use: No    Sexual activity: Not on file   Other Topics Concern    Not on file   Social History Narrative    Not on file     Social Determinants of Health     Financial Resource Strain:     Difficulty of Paying Living Expenses: Not on file   Food Insecurity:     Worried About Running Out of Food in the Last Year: Not on file    Peng of Food in the Last Year: Not on file   Transportation Needs:     Lack of Transportation (Medical): Not on file    Lack of Transportation (Non-Medical):  Not on file   Physical Activity:     Days of Exercise per Week: Not on file    Minutes of Exercise per Session: Not on file   Stress:     Feeling of Stress : Not on file   Social Connections:     Frequency of Communication with Friends and Family: Not on file    Frequency of Social Gatherings with Friends and Family: Not on file    Attends Evangelical Services: Not on file    Active Member of [address] [place] [address] [email or web address] or Organizations: Not on file    Attends Club or Organization Meetings: Not on file    Marital Status: Not on file   Intimate Partner Violence:     Fear of Current or Ex-Partner: Not on file    Emotionally Abused: Not on file    Physically Abused: Not on file    Sexually Abused: Not on file   Housing Stability:     Unable to Pay for Housing in the Last Year: Not on file    Number of [place] in the Last Year: Not on file    Unstable Housing in the Last Year: Not on file       Family History:       Problem Relation Age of Onset    No Known Problems Mother REVIEW OF SYSTEMS:    CONSTITUTIONAL: No fevers or chills. Recent weight loss due to dieting. HEENT:  No nasal congestion or drainage. CARDIOVASCULAR: No chest pain or palpitations  GASTROINTESTINAL: Endorses right upper quadrant abdominal pain, one-time emesis. Denies acute nausea. Endorses intermittent diarrhea. GENITOURINARY: No dysuria, no increased or decreased frequency of urination  HEMATOLOGIC/LYMPHATIC: No easy bruising or bleeding  ENDOCRINE: No polydipsia, no heat or cold intolerance    PHYSICAL EXAM:    VITALS:  BP (!) 155/97   Pulse 78   Temp 98.4 °F (36.9 °C)   Resp 16   Ht 5' 8\" (1.727 m)   Wt 275 lb 6.4 oz (124.9 kg)   SpO2 97%   BMI 41.87 kg/m²   INTAKE/OUTPUT:   No intake or output data in the 24 hours ending 03/15/22 1524    CONSTITUTIONAL: Awake, alert, not acutely distressed  ENT: Normocephalic, atraumatic  NECK: symmetrical, trachea midline   LUNGS: Normal effort on room air no respirations, no accessory muscle usage, no wheezing  CARDIOVASCULAR: Regular rate and rhythm  ABDOMEN: Soft, nondistended, tenderness to palpation in the right upper quadrant without guarding or peritoneal signs  SKIN:   No drainage, induration, or erythema noted. MUSCULOSKELETAL:  No joint swelling, deformity, or tenderness  NEUROLOGIC: Alert and oriented. No focal deficits.   Motor and sensation intact all extremities    CBC:   Lab Results   Component Value Date    WBC 10.6 03/15/2022    RBC 4.63 03/15/2022    HGB 13.5 03/15/2022    HCT 41.0 03/15/2022    MCV 88.6 03/15/2022    MCH 29.2 03/15/2022    MCHC 32.9 03/15/2022    RDW 12.0 03/15/2022     03/15/2022    MPV 9.9 03/15/2022     BMP:    Lab Results   Component Value Date     03/15/2022    K 4.0 03/15/2022     03/15/2022    CO2 23 03/15/2022    BUN 18 03/15/2022    LABALBU 4.3 03/15/2022    CREATININE 0.82 03/15/2022    CALCIUM 9.3 03/15/2022    GFRAA >60 03/15/2022    LABGLOM >60 03/15/2022    GLUCOSE 112 03/15/2022 Pertinent Radiology:   US GALLBLADDER RUQ    Result Date: 3/15/2022  EXAMINATION: RIGHT UPPER QUADRANT ULTRASOUND 3/15/2022 6:58 am COMPARISON: None. HISTORY: ORDERING SYSTEM PROVIDED HISTORY: Pain TECHNOLOGIST PROVIDED HISTORY: Pain 54-year-old female with abdominal pain FINDINGS: LIVER:  The liver demonstrates normal echogenicity without evidence of intrahepatic biliary ductal dilatation. Liver length measures 15.4 cm. Color flow projects over the main portal vein with a normal hepatopetal, monophasic waveform. BILIARY SYSTEM:  Cholelithiasis and gallbladder sludge at the gallbladder neck. Gallbladder wall thickness measures 2 mm. Sonographic Vernona Rower sign is negative. No pericholecystic fluid. Common bile duct is enlarged measuring 6 mm. RIGHT KIDNEY: Right kidney measures 11.4 x 5.1 x 4.9 cm. Right renal cortical thickness measures 1.1 cm. No gross right-sided hydronephrosis. PANCREAS: Suboptimal visualization of the pancreas. Visualized portions of the pancreas are unremarkable. OTHER: No evidence of right upper quadrant ascites. 1. Cholelithiasis/gallbladder sludge. If there is clinical concern for acute cholecystitis, nuclear medicine HIDA scan should be performed. 2. Enlarged common bile duct measuring 6 mm. Further evaluation with MRCP recommended. Correlation with LFTs recommended. RECOMMENDATIONS: Unavailable     MRI ABDOMEN WO CONTRAST MRCP    Result Date: 3/15/2022  EXAMINATION: MRI OF THE ABDOMEN WITHOUT CONTRAST AND MRCP 3/15/2022 9:37 am TECHNIQUE: Multiplanar multisequence MRI of the abdomen was performed without the administration of intravenous contrast.  After initial T2 axial and coronal images, thick slab, thin slab and 3D coronal MRCP sequences were obtained without the administration of intravenous contrast.  MIP images are provided for review.  COMPARISON: None HISTORY: ORDERING SYSTEM PROVIDED HISTORY: elevated lft ductal dilatation ?cbd stone TECHNOLOGIST PROVIDED HISTORY: elevated lft ductal dilatation ?cbd stone What is the sedation requirement?->None Decision Support Exception - unselect if not a suspected or confirmed emergency medical condition->Emergency Medical Condition (MA) Is the patient pregnant?->No Reason for Exam: To ER with RUQ jack and eipgastric pain x 1 day Additional signs and symptoms: abnormal US Relevant Medical/Surgical History: elevated LFTs FINDINGS: There is mild signal loss seen liver of phase images, compatible with fatty infiltration Gallbladder: Gallstones are seen. No abnormal gallbladder thickening. No pericholecystic fluid. Bile Ducts: No intra extrahepatic biliary ductal dilatation. No focal filling defects seen in extrahepatic common duct. Extrahepatic common duct measures 5 mm Pancreatic Duct: Adrenal glands appear normal No hydronephrosis on the right. No hydronephrosis on the left No retroperitoneal adenopathy. No aortic aneurysm No significant small distention noted. Mild stool load seen in the colon. Scattered colonic diverticula are seen     Cholelithiasis. No choledocholithiasis Mild signal loss seen in the liver on out of phase images, compatible with fatty infiltration. ASSESSMENT:  Active Hospital Problems    Diagnosis Date Noted    Elevated LFTs [R79.89] 03/15/2022       39 y.o. female with symptomatic cholelithiasis possible passage of a common duct stone with elevated LFTs  Elevated LFT's, MRCP without evidence of choledocholithiasis    Plan:  1. Continue medical mgmt and supportive care per primary  2. Diet: Okay for clear liquid diet tonight. NPO at mn.   3. IV abx: Zosyn  4. Trend LFT's. Elevated alk phos and bilirubin on admission, however, no evidence of choledocholithiasis on MRCP.   We will plan to repeat hepatic function panel in the morning and tentatively plan for laparoscopic cholecystectomy tomorrow a.m. pending lab results  5. GI consulted, appreciate recommendations       Londa Holstein, DO  General Surgery PGY-3   I attest that I was present with the resident in the emergency department at [facility] during the patient's evaluation and agree with the description of findings and plan as dictated above.   Roxy Maxwell MD

## 2022-03-15 NOTE — ED PROVIDER NOTES
EMERGENCY DEPARTMENT ENCOUNTER    Pt Name: Angie Delgadillo  MRN: [id number]  [place] 1980  Date of evaluation: 3/15/22  CHIEF COMPLAINT       Chief Complaint   Patient presents with    Abdominal Pain     HISTORY OF PRESENT ILLNESS   80-year-old presents with complaints of abdominal pain. Patient states that she had a few episodes of epigastric and right upper quadrant abdominal pain over the past few days. She is concerned that it is possibly her gallbladder. Last night around 9 PM she began having severe pain and has been having pain since then. Associated symptoms include nausea and a couple episodes of vomiting. She denies fevers or chills, she denies any dysuria hematuria. She describes her symptoms as severe. REVIEW OF SYSTEMS     Review of Systems   Constitutional: Negative for chills and fever. HENT: Negative for rhinorrhea and sore throat. Eyes: Negative for discharge, redness and visual disturbance. Respiratory: Negative for cough and shortness of breath. Cardiovascular: Negative for chest pain, palpitations and leg swelling. Gastrointestinal: Positive for abdominal pain, nausea and vomiting. Negative for diarrhea. Genitourinary: Negative for dysuria and hematuria. Musculoskeletal: Negative for arthralgias, myalgias and neck pain. Skin: Negative for color change and rash. Neurological: Negative for seizures, weakness and headaches. Psychiatric/Behavioral: Negative for hallucinations, self-injury and suicidal ideas.      PASTMEDICAL HISTORY     Past Medical History:   Diagnosis Date    Thyroid disease      Past Problem List  Patient Active Problem List   Diagnosis Code    Hypothyroidism E03.9    Lumbago M54.50    Displacement of lumbar intervertebral disc without myelopathy M51.26     SURGICAL HISTORY       Past Surgical History:   Procedure Laterality Date    KNEE SURGERY      NERVE BLOCK  01-13-15    caudal # 1,decadron 10 mg    NERVE BLOCK  1-20-15 caudal epidural steroid block #2 decadron 10 mg    NERVE BLOCK  1/27/15    caudal #3, decadron 10mg     CURRENT MEDICATIONS       Current Discharge Medication List      CONTINUE these medications which have NOT CHANGED    Details   levothyroxine (SYNTHROID) 125 MCG tablet Take 1 tablet by mouth daily      SPRINTEC 28 0.25-35 MG-MCG per tablet       Multiple Vitamins-Minerals (MULTI COMPLETE PO) Take 1 tablet by mouth daily. ibuprofen (ADVIL;MOTRIN) 800 MG tablet Take 1 tablet by mouth every 6 hours as needed for Pain  Qty: 28 tablet, Refills: 0           ALLERGIES     has No Known Allergies. FAMILY HISTORY     She indicated that her mother is alive. SOCIAL HISTORY       Social History     Tobacco Use    Smoking status: Never Smoker    Smokeless tobacco: Never Used   Substance Use Topics    Alcohol use: No     Comment: socially    Drug use: No     PHYSICAL EXAM     INITIAL VITALS: BP (!) 155/97   Pulse 78   Temp 98.4 °F (36.9 °C)   Resp 16   Ht 5' 8\" (1.727 m)   Wt 275 lb 6.4 oz (124.9 kg)   SpO2 97%   BMI 41.87 kg/m²    Physical Exam  Constitutional:       Appearance: Normal appearance. She is well-developed. She is not ill-appearing or toxic-appearing. HENT:      Head: Normocephalic and atraumatic. Eyes:      Conjunctiva/sclera: Conjunctivae normal.      Pupils: Pupils are equal, round, and reactive to light. Neck:      Trachea: Trachea normal.   Cardiovascular:      Rate and Rhythm: Normal rate and regular rhythm. Heart sounds: S1 normal and S2 normal. No murmur heard. Pulmonary:      Effort: Pulmonary effort is normal. No accessory muscle usage or respiratory distress. Breath sounds: Normal breath sounds. Chest:      Chest wall: No deformity or tenderness. Abdominal:      General: Bowel sounds are normal. There is no distension or abdominal bruit. Palpations: Abdomen is not rigid. Tenderness: There is abdominal tenderness in the right upper quadrant.  There is no guarding or rebound. Negative signs include Gregory's sign and McBurney's sign. Musculoskeletal:      Cervical back: Normal range of motion and neck supple. Skin:     General: Skin is warm. Findings: No rash. Neurological:      Mental Status: She is alert and oriented to person, place, and time. GCS: GCS eye subscore is 4. GCS verbal subscore is 5. GCS motor subscore is 6. Psychiatric:         Speech: Speech normal.         MEDICAL DECISION MAKINyear-old presents with complaints of right upper quadrant abdominal pain. Plan is basic labs ultrasound and reevaluation. Jude Dick CRITICAL CARE:       PROCEDURES:    Procedures    DIAGNOSTIC RESULTS   EKG:All EKG's are interpreted by the Emergency Department Physician who either signs or Co-signs this chart in the absence of a cardiologist.        RADIOLOGY:All plain film, CT, MRI, and formal ultrasound images (except ED bedside ultrasound) are read by the radiologist, see reports below, unless otherwisenoted in MDM or here. US GALLBLADDER RUQ    (Results Pending)     LABS: All lab results were reviewed by myself, and all abnormals are listed below. Labs Reviewed   BASIC METABOLIC PANEL   CBC WITH AUTO DIFFERENTIAL   HEPATIC FUNCTION PANEL   LIPASE   URINALYSIS   HCG, SERUM, QUALITATIVE       EMERGENCY DEPARTMENTCOURSE:         Vitals:    Vitals:    03/15/22 0541   BP: (!) 155/97   Pulse: 78   Resp: 16   Temp: 98.4 °F (36.9 °C)   SpO2: 97%   Weight: 275 lb 6.4 oz (124.9 kg)   Height: 5' 8\" (1.727 m)       The patient was given the following medications while in the emergency department:  Orders Placed This Encounter   Medications    ondansetron (ZOFRAN) injection 4 mg    morphine sulfate (PF) injection 4 mg     CONSULTS:  None    FINAL IMPRESSION      RUQ pain       DISPOSITION/PLAN   DISPOSITION    Signed out to Dr Angie Yoo at shift change.      PATIENT REFERRED TO:  Hayley Christy DO  [address] [address]  [phone]    Schedule an appointment as soon as possible for a visit in 2 days      DISCHARGE MEDICATIONS:  Current Discharge Medication List        The care is provided during an unprecedented national emergency due to the novel coronavirus, COVID 19.   MD Alok Polanco MD  03/15/22 0630       Alok Wang MD  03/16/22 [id number]

## 2022-03-16 ENCOUNTER — ANESTHESIA (OUTPATIENT)
Dept: OPERATING ROOM | Age: 42
DRG: 418 | End: 2022-03-16
Payer: COMMERCIAL

## 2022-03-16 VITALS
WEIGHT: 272.5 LBS | OXYGEN SATURATION: 97 % | HEIGHT: 68 IN | SYSTOLIC BLOOD PRESSURE: 125 MMHG | DIASTOLIC BLOOD PRESSURE: 69 MMHG | HEART RATE: 62 BPM | RESPIRATION RATE: 14 BRPM | BODY MASS INDEX: 41.3 KG/M2 | TEMPERATURE: 98.7 F

## 2022-03-16 VITALS — OXYGEN SATURATION: 87 % | DIASTOLIC BLOOD PRESSURE: 105 MMHG | SYSTOLIC BLOOD PRESSURE: 182 MMHG | TEMPERATURE: 98.6 F

## 2022-03-16 LAB
ABSOLUTE EOS #: 0.09 K/UL (ref 0–0.44)
ABSOLUTE IMMATURE GRANULOCYTE: 0.04 K/UL (ref 0–0.3)
ABSOLUTE LYMPH #: 3.09 K/UL (ref 1.1–3.7)
ABSOLUTE MONO #: 0.58 K/UL (ref 0.1–1.2)
ALBUMIN SERPL-MCNC: 3.9 G/DL (ref 3.5–5.2)
ALP BLD-CCNC: 163 U/L (ref 35–104)
ALT SERPL-CCNC: 186 U/L (ref 5–33)
ANION GAP SERPL CALCULATED.3IONS-SCNC: 9 MMOL/L (ref 9–17)
AST SERPL-CCNC: 118 U/L
BASOPHILS # BLD: 1 % (ref 0–2)
BASOPHILS ABSOLUTE: 0.04 K/UL (ref 0–0.2)
BILIRUB SERPL-MCNC: 1.22 MG/DL (ref 0.3–1.2)
BILIRUBIN DIRECT: 0.3 MG/DL
BILIRUBIN, INDIRECT: 0.92 MG/DL (ref 0–1)
BUN BLDV-MCNC: 13 MG/DL (ref 6–20)
BUN/CREAT BLD: 14 (ref 9–20)
CALCIUM SERPL-MCNC: 9.2 MG/DL (ref 8.6–10.4)
CHLORIDE BLD-SCNC: 105 MMOL/L (ref 98–107)
CO2: 24 MMOL/L (ref 20–31)
CREAT SERPL-MCNC: 0.94 MG/DL (ref 0.5–0.9)
EOSINOPHILS RELATIVE PERCENT: 1 % (ref 1–4)
GFR AFRICAN AMERICAN: >60 ML/MIN
GFR NON-AFRICAN AMERICAN: >60 ML/MIN
GFR SERPL CREATININE-BSD FRML MDRD: ABNORMAL ML/MIN/{1.73_M2}
GLUCOSE BLD-MCNC: 85 MG/DL (ref 70–99)
HCT VFR BLD CALC: 39.2 % (ref 36.3–47.1)
HEMOGLOBIN: 12.9 G/DL (ref 11.9–15.1)
IMMATURE GRANULOCYTES: 1 %
INR BLD: 1
LYMPHOCYTES # BLD: 37 % (ref 24–43)
MCH RBC QN AUTO: 29.8 PG (ref 25.2–33.5)
MCHC RBC AUTO-ENTMCNC: 32.9 G/DL (ref 28.4–34.8)
MCV RBC AUTO: 90.5 FL (ref 82.6–102.9)
MONOCYTES # BLD: 7 % (ref 3–12)
NRBC AUTOMATED: 0 PER 100 WBC
PDW BLD-RTO: 12.2 % (ref 11.8–14.4)
PLATELET # BLD: 224 K/UL (ref 138–453)
PMV BLD AUTO: 10.2 FL (ref 8.1–13.5)
POTASSIUM SERPL-SCNC: 4.2 MMOL/L (ref 3.7–5.3)
PROTHROMBIN TIME: 13.2 SEC (ref 11.5–14.2)
RBC # BLD: 4.33 M/UL (ref 3.95–5.11)
SEG NEUTROPHILS: 53 % (ref 36–65)
SEGMENTED NEUTROPHILS ABSOLUTE COUNT: 4.44 K/UL (ref 1.5–8.1)
SODIUM BLD-SCNC: 138 MMOL/L (ref 135–144)
TOTAL PROTEIN: 6.1 G/DL (ref 6.4–8.3)
WBC # BLD: 8.3 K/UL (ref 3.5–11.3)

## 2022-03-16 PROCEDURE — 2580000003 HC RX 258: Performed by: FAMILY MEDICINE

## 2022-03-16 PROCEDURE — 88304 TISSUE EXAM BY PATHOLOGIST: CPT

## 2022-03-16 PROCEDURE — 3600000013 HC SURGERY LEVEL 3 ADDTL 15MIN: Performed by: SURGERY

## 2022-03-16 PROCEDURE — 80076 HEPATIC FUNCTION PANEL: CPT

## 2022-03-16 PROCEDURE — 6360000002 HC RX W HCPCS: Performed by: STUDENT IN AN ORGANIZED HEALTH CARE EDUCATION/TRAINING PROGRAM

## 2022-03-16 PROCEDURE — 2709999900 HC NON-CHARGEABLE SUPPLY: Performed by: SURGERY

## 2022-03-16 PROCEDURE — 2580000003 HC RX 258: Performed by: STUDENT IN AN ORGANIZED HEALTH CARE EDUCATION/TRAINING PROGRAM

## 2022-03-16 PROCEDURE — 85025 COMPLETE CBC W/AUTO DIFF WBC: CPT

## 2022-03-16 PROCEDURE — 80048 BASIC METABOLIC PNL TOTAL CA: CPT

## 2022-03-16 PROCEDURE — 7100000000 HC PACU RECOVERY - FIRST 15 MIN: Performed by: SURGERY

## 2022-03-16 PROCEDURE — 2500000003 HC RX 250 WO HCPCS: Performed by: SURGERY

## 2022-03-16 PROCEDURE — 6370000000 HC RX 637 (ALT 250 FOR IP): Performed by: SURGERY

## 2022-03-16 PROCEDURE — 3700000000 HC ANESTHESIA ATTENDED CARE: Performed by: SURGERY

## 2022-03-16 PROCEDURE — 2500000003 HC RX 250 WO HCPCS: Performed by: NURSE ANESTHETIST, CERTIFIED REGISTERED

## 2022-03-16 PROCEDURE — 6360000002 HC RX W HCPCS: Performed by: NURSE ANESTHETIST, CERTIFIED REGISTERED

## 2022-03-16 PROCEDURE — 2580000003 HC RX 258: Performed by: SURGERY

## 2022-03-16 PROCEDURE — 85610 PROTHROMBIN TIME: CPT

## 2022-03-16 PROCEDURE — 36415 COLL VENOUS BLD VENIPUNCTURE: CPT

## 2022-03-16 PROCEDURE — 6360000002 HC RX W HCPCS: Performed by: ANESTHESIOLOGY

## 2022-03-16 PROCEDURE — 2580000003 HC RX 258: Performed by: NURSE ANESTHETIST, CERTIFIED REGISTERED

## 2022-03-16 PROCEDURE — 0FT44ZZ RESECTION OF GALLBLADDER, PERCUTANEOUS ENDOSCOPIC APPROACH: ICD-10-PCS | Performed by: SURGERY

## 2022-03-16 PROCEDURE — 3700000001 HC ADD 15 MINUTES (ANESTHESIA): Performed by: SURGERY

## 2022-03-16 PROCEDURE — 3600000003 HC SURGERY LEVEL 3 BASE: Performed by: SURGERY

## 2022-03-16 PROCEDURE — 7100000001 HC PACU RECOVERY - ADDTL 15 MIN: Performed by: SURGERY

## 2022-03-16 PROCEDURE — G0378 HOSPITAL OBSERVATION PER HR: HCPCS

## 2022-03-16 RX ORDER — ROCURONIUM BROMIDE 10 MG/ML
INJECTION, SOLUTION INTRAVENOUS PRN
Status: DISCONTINUED | OUTPATIENT
Start: 2022-03-16 | End: 2022-03-16 | Stop reason: SDUPTHER

## 2022-03-16 RX ORDER — BUPIVACAINE HYDROCHLORIDE AND EPINEPHRINE 5; 5 MG/ML; UG/ML
INJECTION, SOLUTION EPIDURAL; INTRACAUDAL; PERINEURAL PRN
Status: DISCONTINUED | OUTPATIENT
Start: 2022-03-16 | End: 2022-03-16 | Stop reason: HOSPADM

## 2022-03-16 RX ORDER — PROPOFOL 10 MG/ML
INJECTION, EMULSION INTRAVENOUS PRN
Status: DISCONTINUED | OUTPATIENT
Start: 2022-03-16 | End: 2022-03-16 | Stop reason: SDUPTHER

## 2022-03-16 RX ORDER — LIDOCAINE HYDROCHLORIDE 20 MG/ML
INJECTION, SOLUTION EPIDURAL; INFILTRATION; INTRACAUDAL; PERINEURAL PRN
Status: DISCONTINUED | OUTPATIENT
Start: 2022-03-16 | End: 2022-03-16 | Stop reason: SDUPTHER

## 2022-03-16 RX ORDER — DEXAMETHASONE SODIUM PHOSPHATE 10 MG/ML
INJECTION INTRAMUSCULAR; INTRAVENOUS PRN
Status: DISCONTINUED | OUTPATIENT
Start: 2022-03-16 | End: 2022-03-16 | Stop reason: SDUPTHER

## 2022-03-16 RX ORDER — ONDANSETRON 2 MG/ML
INJECTION INTRAMUSCULAR; INTRAVENOUS PRN
Status: DISCONTINUED | OUTPATIENT
Start: 2022-03-16 | End: 2022-03-16 | Stop reason: SDUPTHER

## 2022-03-16 RX ORDER — SODIUM CHLORIDE, SODIUM LACTATE, POTASSIUM CHLORIDE, CALCIUM CHLORIDE 600; 310; 30; 20 MG/100ML; MG/100ML; MG/100ML; MG/100ML
INJECTION, SOLUTION INTRAVENOUS CONTINUOUS PRN
Status: DISCONTINUED | OUTPATIENT
Start: 2022-03-16 | End: 2022-03-16 | Stop reason: SDUPTHER

## 2022-03-16 RX ORDER — OXYCODONE HYDROCHLORIDE AND ACETAMINOPHEN 5; 325 MG/1; MG/1
1 TABLET ORAL EVERY 4 HOURS PRN
Status: DISCONTINUED | OUTPATIENT
Start: 2022-03-16 | End: 2022-03-16 | Stop reason: HOSPADM

## 2022-03-16 RX ORDER — ONDANSETRON 4 MG/1
4 TABLET, ORALLY DISINTEGRATING ORAL EVERY 8 HOURS PRN
Qty: 9 TABLET | Refills: 1 | Status: SHIPPED | OUTPATIENT
Start: 2022-03-16 | End: 2022-03-19

## 2022-03-16 RX ORDER — GLYCOPYRROLATE 1 MG/5 ML
SYRINGE (ML) INTRAVENOUS PRN
Status: DISCONTINUED | OUTPATIENT
Start: 2022-03-16 | End: 2022-03-16 | Stop reason: SDUPTHER

## 2022-03-16 RX ORDER — FENTANYL CITRATE 50 UG/ML
INJECTION, SOLUTION INTRAMUSCULAR; INTRAVENOUS PRN
Status: DISCONTINUED | OUTPATIENT
Start: 2022-03-16 | End: 2022-03-16 | Stop reason: SDUPTHER

## 2022-03-16 RX ORDER — MIDAZOLAM HYDROCHLORIDE 1 MG/ML
INJECTION INTRAMUSCULAR; INTRAVENOUS PRN
Status: DISCONTINUED | OUTPATIENT
Start: 2022-03-16 | End: 2022-03-16 | Stop reason: SDUPTHER

## 2022-03-16 RX ORDER — KETOROLAC TROMETHAMINE 30 MG/ML
INJECTION, SOLUTION INTRAMUSCULAR; INTRAVENOUS PRN
Status: DISCONTINUED | OUTPATIENT
Start: 2022-03-16 | End: 2022-03-16 | Stop reason: SDUPTHER

## 2022-03-16 RX ORDER — FENTANYL CITRATE 50 UG/ML
25 INJECTION, SOLUTION INTRAMUSCULAR; INTRAVENOUS EVERY 5 MIN PRN
Status: DISCONTINUED | OUTPATIENT
Start: 2022-03-16 | End: 2022-03-16 | Stop reason: HOSPADM

## 2022-03-16 RX ORDER — PIPERACILLIN SODIUM, TAZOBACTAM SODIUM 3; .375 G/15ML; G/15ML
INJECTION, POWDER, LYOPHILIZED, FOR SOLUTION INTRAVENOUS PRN
Status: DISCONTINUED | OUTPATIENT
Start: 2022-03-16 | End: 2022-03-16 | Stop reason: SDUPTHER

## 2022-03-16 RX ORDER — FENTANYL CITRATE 50 UG/ML
50 INJECTION, SOLUTION INTRAMUSCULAR; INTRAVENOUS EVERY 5 MIN PRN
Status: DISCONTINUED | OUTPATIENT
Start: 2022-03-16 | End: 2022-03-16 | Stop reason: HOSPADM

## 2022-03-16 RX ORDER — NEOSTIGMINE METHYLSULFATE 5 MG/5 ML
SYRINGE (ML) INTRAVENOUS PRN
Status: DISCONTINUED | OUTPATIENT
Start: 2022-03-16 | End: 2022-03-16 | Stop reason: SDUPTHER

## 2022-03-16 RX ORDER — SODIUM CHLORIDE 9 MG/ML
INJECTION, SOLUTION INTRAVENOUS CONTINUOUS
Status: DISCONTINUED | OUTPATIENT
Start: 2022-03-16 | End: 2022-03-16 | Stop reason: HOSPADM

## 2022-03-16 RX ORDER — ONDANSETRON 2 MG/ML
4 INJECTION INTRAMUSCULAR; INTRAVENOUS
Status: COMPLETED | OUTPATIENT
Start: 2022-03-16 | End: 2022-03-16

## 2022-03-16 RX ORDER — OXYCODONE HYDROCHLORIDE AND ACETAMINOPHEN 5; 325 MG/1; MG/1
1 TABLET ORAL EVERY 4 HOURS PRN
Qty: 18 TABLET | Refills: 0 | Status: SHIPPED | OUTPATIENT
Start: 2022-03-16 | End: 2022-03-19

## 2022-03-16 RX ADMIN — PIPERACILLIN AND TAZOBACTAM 3375 MG: 3; .375 INJECTION, POWDER, LYOPHILIZED, FOR SOLUTION INTRAVENOUS at 08:28

## 2022-03-16 RX ADMIN — PIPERACILLIN AND TAZOBACTAM 3.38 G: 3; .375 INJECTION, POWDER, LYOPHILIZED, FOR SOLUTION INTRAVENOUS; PARENTERAL at 09:10

## 2022-03-16 RX ADMIN — MIDAZOLAM 2 MG: 1 INJECTION INTRAMUSCULAR; INTRAVENOUS at 09:00

## 2022-03-16 RX ADMIN — ONDANSETRON 4 MG: 2 INJECTION INTRAMUSCULAR; INTRAVENOUS at 10:13

## 2022-03-16 RX ADMIN — PROPOFOL 200 MG: 10 INJECTION, EMULSION INTRAVENOUS at 09:04

## 2022-03-16 RX ADMIN — ROCURONIUM BROMIDE 40 MG: 10 INJECTION, SOLUTION INTRAVENOUS at 09:04

## 2022-03-16 RX ADMIN — KETOROLAC TROMETHAMINE 30 MG: 30 INJECTION, SOLUTION INTRAMUSCULAR; INTRAVENOUS at 09:39

## 2022-03-16 RX ADMIN — DEXAMETHASONE SODIUM PHOSPHATE 10 MG: 10 INJECTION INTRAMUSCULAR; INTRAVENOUS at 09:04

## 2022-03-16 RX ADMIN — Medication 5 MG: at 09:42

## 2022-03-16 RX ADMIN — FENTANYL CITRATE 50 MCG: 50 INJECTION INTRAMUSCULAR; INTRAVENOUS at 10:17

## 2022-03-16 RX ADMIN — LIDOCAINE HYDROCHLORIDE 60 MG: 20 INJECTION, SOLUTION EPIDURAL; INFILTRATION; INTRACAUDAL; PERINEURAL at 09:04

## 2022-03-16 RX ADMIN — FENTANYL CITRATE 25 MCG: 50 INJECTION INTRAMUSCULAR; INTRAVENOUS at 10:23

## 2022-03-16 RX ADMIN — FENTANYL CITRATE 25 MCG: 50 INJECTION INTRAMUSCULAR; INTRAVENOUS at 10:38

## 2022-03-16 RX ADMIN — Medication 1 MG: at 09:42

## 2022-03-16 RX ADMIN — SODIUM CHLORIDE, POTASSIUM CHLORIDE, SODIUM LACTATE AND CALCIUM CHLORIDE: 600; 310; 30; 20 INJECTION, SOLUTION INTRAVENOUS at 09:00

## 2022-03-16 RX ADMIN — SODIUM CHLORIDE: 9 INJECTION, SOLUTION INTRAVENOUS at 11:18

## 2022-03-16 RX ADMIN — ONDANSETRON 4 MG: 2 INJECTION INTRAMUSCULAR; INTRAVENOUS at 09:32

## 2022-03-16 RX ADMIN — SODIUM CHLORIDE, PRESERVATIVE FREE 10 ML: 5 INJECTION INTRAVENOUS at 08:29

## 2022-03-16 RX ADMIN — OXYCODONE AND ACETAMINOPHEN 1 TABLET: 5; 325 TABLET ORAL at 12:18

## 2022-03-16 RX ADMIN — Medication 100 MCG: at 09:04

## 2022-03-16 ASSESSMENT — PULMONARY FUNCTION TESTS
PIF_VALUE: 25
PIF_VALUE: 2
PIF_VALUE: 26
PIF_VALUE: 25
PIF_VALUE: 19
PIF_VALUE: 20
PIF_VALUE: 22
PIF_VALUE: 27
PIF_VALUE: 0
PIF_VALUE: 3
PIF_VALUE: 23
PIF_VALUE: 0
PIF_VALUE: 24
PIF_VALUE: 23
PIF_VALUE: 0
PIF_VALUE: 25
PIF_VALUE: 2
PIF_VALUE: 20
PIF_VALUE: 26
PIF_VALUE: 24
PIF_VALUE: 18
PIF_VALUE: 21
PIF_VALUE: 24
PIF_VALUE: 1
PIF_VALUE: 24
PIF_VALUE: 20
PIF_VALUE: 29
PIF_VALUE: 26
PIF_VALUE: 26
PIF_VALUE: 1
PIF_VALUE: 27
PIF_VALUE: 19
PIF_VALUE: 0
PIF_VALUE: 24
PIF_VALUE: 21
PIF_VALUE: 18
PIF_VALUE: 52
PIF_VALUE: 24
PIF_VALUE: 22
PIF_VALUE: 24
PIF_VALUE: 22
PIF_VALUE: 20
PIF_VALUE: 16
PIF_VALUE: 24
PIF_VALUE: 25
PIF_VALUE: 1
PIF_VALUE: 14
PIF_VALUE: 4
PIF_VALUE: 1
PIF_VALUE: 18
PIF_VALUE: 29
PIF_VALUE: 28

## 2022-03-16 ASSESSMENT — PAIN DESCRIPTION - PAIN TYPE
TYPE: ACUTE PAIN
TYPE: ACUTE PAIN

## 2022-03-16 ASSESSMENT — PAIN SCALES - GENERAL
PAINLEVEL_OUTOF10: 5
PAINLEVEL_OUTOF10: 9
PAINLEVEL_OUTOF10: 2
PAINLEVEL_OUTOF10: 2
PAINLEVEL_OUTOF10: 4
PAINLEVEL_OUTOF10: 2
PAINLEVEL_OUTOF10: 5
PAINLEVEL_OUTOF10: 2
PAINLEVEL_OUTOF10: 7
PAINLEVEL_OUTOF10: 3
PAINLEVEL_OUTOF10: 4

## 2022-03-16 ASSESSMENT — PAIN DESCRIPTION - DESCRIPTORS
DESCRIPTORS: ACHING;SORE;TIGHTNESS
DESCRIPTORS: CONSTANT
DESCRIPTORS: DISCOMFORT
DESCRIPTORS: ACHING;TIGHTNESS

## 2022-03-16 ASSESSMENT — PAIN DESCRIPTION - LOCATION
LOCATION: ABDOMEN
LOCATION: ABDOMEN
LOCATION: ABDOMEN;HEAD
LOCATION: ABDOMEN
LOCATION: ABDOMEN

## 2022-03-16 ASSESSMENT — PAIN DESCRIPTION - PROGRESSION
CLINICAL_PROGRESSION: GRADUALLY WORSENING
CLINICAL_PROGRESSION: GRADUALLY IMPROVING

## 2022-03-16 ASSESSMENT — ENCOUNTER SYMPTOMS
EYE REDNESS: 0
SHORTNESS OF BREATH: 0
RHINORRHEA: 0
NAUSEA: 1
SORE THROAT: 0
DIARRHEA: 0
VOMITING: 1
ABDOMINAL PAIN: 1
EYE DISCHARGE: 0
COUGH: 0
COLOR CHANGE: 0

## 2022-03-16 ASSESSMENT — PAIN DESCRIPTION - ORIENTATION: ORIENTATION: UPPER

## 2022-03-16 ASSESSMENT — PAIN DESCRIPTION - ONSET: ONSET: PROGRESSIVE

## 2022-03-16 ASSESSMENT — PAIN - FUNCTIONAL ASSESSMENT: PAIN_FUNCTIONAL_ASSESSMENT: PREVENTS OR INTERFERES SOME ACTIVE ACTIVITIES AND ADLS

## 2022-03-16 NOTE — PROGRESS NOTES
CLINICAL PHARMACY NOTE: MEDS TO BEDS    Total # of Prescriptions Filled: 2   The following medications were delivered to the patient:  · PERCOCET 5-325  · ONDANSETRON 4MG    Additional Documentation:

## 2022-03-16 NOTE — ANESTHESIA POSTPROCEDURE EVALUATION
Department of Anesthesiology  Postprocedure Note    Patient: Brice Jones  MRN: [id number]  YOB: 1980  Date of evaluation: 3/16/2022  Time:  1:17 PM     Procedure Summary     Date: 03/16/22 Room / Location: [address] [place] [address] / [facility] - INPATIENT    Anesthesia Start: 0900 Anesthesia Stop: 1003    Procedure: CHOLECYSTECTOMY LAPAROSCOPIC (N/A Abdomen) Diagnosis: (DX GALLSTONES)    Surgeons: Akil Lewis DO Responsible Provider: Maday Dc MD    Anesthesia Type: general ASA Status: 2          Anesthesia Type: general    Sarah Phase I: Sarah Score: 9    Sarah Phase II:      Last vitals: Reviewed and per EMR flowsheets.        Anesthesia Post Evaluation    Complications: no

## 2022-03-16 NOTE — PROGRESS NOTES
Pt admitted to room 2023 from PACU  Oriented to room and call light/tv controls. Bed in lowest position, wheels locked, 2/4 side rails up  Call light in reach, room free of clutter, adequate lighting provided.   Electronically signed by Cyndee Martinez RN on 3/16/2022 at 10:59 AM

## 2022-03-16 NOTE — CARE COORDINATION
Case Management Initial Discharge Plan  Ace Wang,             Met with:patient or family member patient, mother and father to discuss discharge plans. Information verified: address, contacts, phone number, , insurance Yes  PCP: Nomi Romero DO  Date of last visit: last week     Insurance Provider: [address] [place] [address]    Discharge Planning    Living Arrangements:  Alone   Support Systems:  None    Home has 2 stories  3 stairs to climb to get into front door, 0 stairs to climb to reach second floor  Location of bedroom/bathroom in home  - stays on main floor    Patient able to perform ADL's:Independent    Current Services (outpatient & in home) none  DME equipment: none  DME provider: N/A    Pharmacy: [facility] in [place]    Potential Assistance Needed:  N/A    Patient agreeable to home care: No  [place] of choice provided:  n/a    Prior SNF/Rehab Placement and Facility: No  Agreeable to SNF/Rehab: No  [place] of choice provided: n/a   Evaluation: n/a    Expected Discharge date:     Patient expects to be discharged to: Follow Up Appointment: Best Day/ Time:      Transportation provider: parents  Transportation arrangements needed for discharge: No    Readmission Risk              Risk of Unplanned Readmission:  7             Does patient have a readmission risk score greater than 14?: No  If yes, follow-up appointment must be made within 7 days of discharge. Goal of Care:       Discharge Plan: Met with patient and parents at bedside. Lives alone, independent and works from home full time. S/P lap cholecystectomy per Dr. Napoleon Maya. Plan is to go to sister's home when discharged. No needs anticipated and continue to follow post op.           Electronically signed by Elizabeth Guzman RN on 3/16/22 at 11:54 AM EDT

## 2022-03-16 NOTE — ANESTHESIA PRE PROCEDURE
Department of Anesthesiology  Preprocedure Note       Name:  Alessandra Khan   Age:  39 y.o.  :  1980                                          MRN:  [id number]         Date:  3/16/2022      Surgeon: Edwin Plasencia):  Erlinda Ganser, DO    Procedure: Procedure(s):  CHOLECYSTECTOMY LAPAROSCOPIC    Medications prior to admission:   Prior to Admission medications    Medication Sig Start Date End Date Taking? Authorizing Provider   b complex vitamins capsule Take 1 capsule by mouth daily   Yes Historical Provider, MD   Cholecalciferol (VITAMIN D3) 1.25 MG (79656 UT) CAPS Take 1 capsule by mouth daily   Yes Historical Provider, MD   Omega-3 Fatty Acids (FISH OIL) 1000 MG CAPS Take 1,000 mg by mouth daily   Yes Historical Provider, MD   MAGNESIUM MALATE PO Take 100 mg by mouth daily   Yes Historical Provider, MD   levothyroxine (SYNTHROID) 125 MCG tablet Take 1 tablet by mouth daily 18  Yes Historical Provider, MD   [address] [place] [address] 0.25-35 MG-MCG per tablet Take 1 tablet by mouth daily  14  Yes Historical Provider, MD   Multiple Vitamins-Minerals (MULTI COMPLETE PO) Take 1 tablet by mouth daily.    Yes Historical Provider, MD   ibuprofen (ADVIL;MOTRIN) 800 MG tablet Take 1 tablet by mouth every 6 hours as needed for Pain 20  Adam Jensen PA-C       Current medications:    Current Facility-Administered Medications   Medication Dose Route Frequency Provider Last Rate Last Admin    piperacillin-tazobactam (ZOSYN) 3,375 mg in dextrose 5 % 50 mL IVPB (mini-bag)  3,375 mg IntraVENous Q8H Silvia Garces DO 12.5 mL/hr at 22 0828 3,375 mg at 22 0828    sodium chloride flush 0.9 % injection 5-40 mL  5-40 mL IntraVENous 2 times per day Zeynep Lucia MD   10 mL at 22 0829    sodium chloride flush 0.9 % injection 10 mL  10 mL IntraVENous PRN Zeynep Lucia MD        0.9 % sodium chloride infusion  25 mL IntraVENous PRN Sabrina Huff MD        potassium chloride (KLOR-CON M) extended release tablet 40 mEq  40 mEq Oral PRN Zeynep Lucia MD        Or    potassium bicarb-citric acid (EFFER-K) effervescent tablet 40 mEq  40 mEq Oral PRN Zeynep Lucia MD        Or    potassium chloride 10 mEq/100 mL IVPB (Peripheral Line)  10 mEq IntraVENous PRN Zeynep Lucia MD        magnesium sulfate 1000 mg in dextrose 5% 100 mL IVPB  1,000 mg IntraVENous PRN Neva Newman MD        [Held by provider] enoxaparin (LOVENOX) injection 30 mg  30 mg SubCUTAneous BID Zeynep Lucia MD   30 mg at 03/15/22 2257    ondansetron (ZOFRAN-ODT) disintegrating tablet 4 mg  4 mg Oral Q8H PRN Zeynep Lucia MD        Or    ondansetron (ZOFRAN) injection 4 mg  4 mg IntraVENous Q6H PRN Zeynep Lucia MD        polyethylene glycol (GLYCOLAX) packet 17 g  17 g Oral Daily PRN Zeynep Lucia MD        acetaminophen (TYLENOL) tablet 650 mg  650 mg Oral Q6H PRN Zeynep Lucia MD        Or    acetaminophen (TYLENOL) suppository 650 mg  650 mg Rectal Q6H PRN Zeynep Lucia MD        morphine sulfate (PF) injection 4 mg  4 mg IntraVENous Q4H PRN Zeynep Lucia MD        0.9 % sodium chloride infusion   IntraVENous Continuous Neva Newman MD   Paused at 03/15/22 2301       Allergies:  No Known Allergies    Problem List:    Patient Active Problem List   Diagnosis Code    Hypothyroidism E03.9    Lumbago M54.50    Displacement of lumbar intervertebral disc without myelopathy M51.26    Elevated LFTs R79.89    Calculus of gallbladder with acute cholecystitis and obstruction K80.01    Acute cholecystitis K81.0       Past Medical History:        Diagnosis Date    Thyroid disease        Past Surgical History:        Procedure Laterality Date    KNEE SURGERY      NERVE BLOCK  01-13-15    caudal # 1,decadron 10 mg    NERVE BLOCK  1-20-15    caudal epidural steroid block #2 decadron 10 mg    NERVE BLOCK  1/27/15    caudal #3, decadron 10mg       Social History:    Social History     Tobacco Use    Smoking status: Never Smoker    Smokeless tobacco: Never Used   Substance Use Topics    Alcohol use: No     Comment: socially                                Counseling given: Not Answered      Vital Signs (Current):   Vitals:    03/15/22 0541 03/15/22 2006 03/16/22 0110 03/16/22 0737   BP: (!) 155/97 114/60  129/74   Pulse: 78 61  75   Resp: 16 14 17   Temp: 98.4 °F (36.9 °C) 96 °F (35.6 °C)  98.6 °F (37 °C)   TempSrc:  Oral  Oral   SpO2: 97%   96%   Weight: 275 lb 6.4 oz (124.9 kg)  272 lb 8 oz (123.6 kg)    Height: 5' 8\" (1.727 m)                                                 BP Readings from Last 3 Encounters:   03/16/22 129/74   05/02/20 130/78   01/21/19 110/66       NPO Status:                                                                                 BMI:   Wt Readings from Last 3 Encounters:   03/16/22 272 lb 8 oz (123.6 kg)   01/21/19 286 lb (129.7 kg)   08/05/18 282 lb (127.9 kg)     Body mass index is 41.43 kg/m².     CBC:   Lab Results   Component Value Date    WBC 8.3 03/16/2022    RBC 4.33 03/16/2022    HGB 12.9 03/16/2022    HCT 39.2 03/16/2022    MCV 90.5 03/16/2022    RDW 12.2 03/16/2022     03/16/2022       CMP:   Lab Results   Component Value Date     03/16/2022    K 4.2 03/16/2022     03/16/2022    CO2 24 03/16/2022    BUN 13 03/16/2022    CREATININE 0.94 03/16/2022    GFRAA >60 03/16/2022    LABGLOM >60 03/16/2022    GLUCOSE 85 03/16/2022    PROT 6.1 03/16/2022    CALCIUM 9.2 03/16/2022    BILITOT 1.22 03/16/2022    ALKPHOS 163 03/16/2022     03/16/2022     03/16/2022       POC Tests:   Recent Labs     03/15/22  2109   POCGLU 89       Coags:   Lab Results   Component Value Date    PROTIME 13.2 03/16/2022    INR 1.0 03/16/2022       HCG (If Applicable): No results found for: PREGTESTUR, PREGSERUM, HCG, HCGQUANT     ABGs: No results found for: PHART, PO2ART, IKG9ILD, YSQ8ZTO, BEART, F3HXMXWX     Type & Screen (If Applicable):  No results found for: LABABO, LABRH    Drug/Infectious Status (If Applicable):  Lab Results   Component Value Date    HEPCAB NONREACTIVE 03/15/2022       COVID-19 Screening (If Applicable): No results found for: COVID19        Anesthesia Evaluation    Airway: Mallampati: I  TM distance: >3 FB   Neck ROM: full  Mouth opening: > = 3 FB Dental:          Pulmonary:                              Cardiovascular:                      Neuro/Psych:               GI/Hepatic/Renal:             Endo/Other:                     Abdominal:             Vascular:           Other Findings:             Anesthesia Plan      general     ASA 2                                 Joao Cheng MD   3/16/2022

## 2022-03-16 NOTE — PLAN OF CARE
Problem: Pain:  Goal: Pain level will decrease  Description: Pain level will decrease  3/16/2022 0818 by Brandon Drake RN  Outcome: Ongoing  3/16/2022 0111 by Oksana Joseph RN  Outcome: Ongoing  Note: Pain level assessment complete.    Patient educated on pain scale and control interventions  PRN pain medication given per patient request  Patient instructed to call out with new onset of pain or unrelieved pain    Goal: Control of acute pain  Description: Control of acute pain  3/16/2022 0818 by Brandon Drake RN  Outcome: Ongoing  3/16/2022 0111 by Oksana Joseph RN  Outcome: Ongoing  Goal: Control of chronic pain  Description: Control of chronic pain  3/16/2022 0818 by Brandon Drake RN  Outcome: Ongoing  3/16/2022 0111 by Oksana Joseph RN  Outcome: Ongoing     Problem: Fluid Volume:  Goal: Ability to achieve a balanced intake and output will improve  Description: Ability to achieve a balanced intake and output will improve  3/16/2022 0818 by Brandon Drake RN  Outcome: Ongoing  3/16/2022 0111 by Oksana Joseph RN  Outcome: Ongoing     Problem: Physical Regulation:  Goal: Ability to maintain clinical measurements within normal limits will improve  Description: Ability to maintain clinical measurements within normal limits will improve  3/16/2022 0818 by Brandon Drake RN  Outcome: Ongoing  3/16/2022 0111 by Oksana Joseph RN  Outcome: Ongoing  Goal: Will show no signs and symptoms of electrolyte imbalance  Description: Will show no signs and symptoms of electrolyte imbalance  3/16/2022 0818 by Brandon Drake RN  Outcome: Ongoing  3/16/2022 0111 by Oksana Joseph RN  Outcome: Ongoing     Problem: Falls - Risk of:  Goal: Will remain free from falls  Description: Will remain free from falls  Outcome: Ongoing  Goal: Absence of physical injury  Description: Absence of physical injury  Outcome: Ongoing     Problem: HEMODYNAMIC STATUS  Goal: Patient has stable vital signs and fluid balance  Outcome: Ongoing     Problem: OXYGENATION/RESPIRATORY FUNCTION  Goal: Patient will achieve/maintain normal respiratory rate/effort  Outcome: Ongoing     Problem: MOBILITY  Goal: Early mobilization is achieved  Outcome: Ongoing     Problem: ELIMINATION  Goal: Elimination patterns are normal or improving  Description: Elimination patterns return to pre-surgery normal patterns  Outcome: Ongoing     Problem: SKIN INTEGRITY  Goal: Skin integrity is maintained or improved  Outcome: Ongoing

## 2022-03-16 NOTE — H&P
History & Physical  [facility]    Adult Hospitalist      Name: Deandre Simmons  MRN: [id number]     Acct: [de-identified]  Room: 2023/2023-01    Admit Date: 3/15/2022  5:49 AM  PCP: Elena Bhandari DO    Primary Problem  Principal Problem:    Elevated LFTs  Active Problems:    Acute cholecystitis  Resolved Problems:    * No resolved hospital problems. *        Assesment:     · Acute cholecystitis  · Cholelithiasis  · Right upper quadrant pain  · Abnormal LFTs  · Obesity with BMI 41.8  · Hypothyroidism  · Chronic low back pain  · Lumbar disc herniation          Plan:     · Admit to [facility]  · Monitor vitals closely  · Keep SPO2 above 90%  · I's and O's  · IV fluids  · Pain control  · Antiemetics as needed  · N.p.o.  · Consulted GI in ED  · Consult surgery  · IV antibiotics  · CBC, BMP  · Hold nonessential home medication  · DVT and GI prophylaxis. Chief Complaint:     Chief Complaint   Patient presents with    Abdominal Pain         History of Present Illness:      Deandre Simmons is a 39 y.o.  female who presents with Abdominal Pain    Patient admitted through the emergency room where she presented with upper abdominal pain for more than a week. Patient says she had epigastric moderate to severe sharp pain 1 week ago. Patient says the pain became more dull and settled in her right upper quadrant for almost a day. She had mild nausea associated with that. Patient took an over-the-counter gallbladder lavage over the last 3 days. Last night she had a recurrence of epigastric pain which was more severe than last time. Patient also had nausea and vomiting associated with it. Pain again settled in the right upper quadrant and has been continuous since then. Patient waited initially but then presented to the emergency room for further investigation and treatment    Patient denies any chest pain, dyspnea or orthopnea. Denies any headache, fever or chills. Denies any neck pain, photophobia or diplopia. Denies diarrhea, constipation. Denies rash or joint swelling    I have personally reviewed the past medical history, past surgical history, medications, social history, and family history, and summarized in the note. Review of Systems:     All 10 point system is reviewed and negative otherwise mentioned in HPI. Past Medical History:     Past Medical History:   Diagnosis Date    Thyroid disease         Past Surgical History:     Past Surgical History:   Procedure Laterality Date    KNEE SURGERY      NERVE BLOCK  01-13-15    caudal # 1,decadron 10 mg    NERVE BLOCK  1-20-15    caudal epidural steroid block #2 decadron 10 mg    NERVE BLOCK  1/27/15    caudal #3, decadron 10mg        Medications Prior to Admission:       Prior to Admission medications    Medication Sig Start Date End Date Taking? Authorizing Provider   b complex vitamins capsule Take 1 capsule by mouth daily   Yes Historical Provider, MD   Cholecalciferol (VITAMIN D3) 1.25 MG (24203 UT) CAPS Take 1 capsule by mouth daily   Yes Historical Provider, MD   Omega-3 Fatty Acids (FISH OIL) 1000 MG CAPS Take 1,000 mg by mouth daily   Yes Historical Provider, MD   MAGNESIUM MALATE PO Take 100 mg by mouth daily   Yes Historical Provider, MD   levothyroxine (SYNTHROID) 125 MCG tablet Take 1 tablet by mouth daily 6/29/18  Yes Historical Provider, MD   [facility] [place] [address] 0.25-35 MG-MCG per tablet Take 1 tablet by mouth daily  12/26/14  Yes Historical Provider, MD   Multiple Vitamins-Minerals (MULTI COMPLETE PO) Take 1 tablet by mouth daily. Yes Historical Provider, MD   ibuprofen (ADVIL;MOTRIN) 800 MG tablet Take 1 tablet by mouth every 6 hours as needed for Pain 5/2/20 5/9/20  Evaristo Tirpp PA-C        Allergies:       Patient has no known allergies. Social History:     Tobacco:    reports that she has never smoked. She has never used smokeless tobacco.  Alcohol:      reports no history of alcohol use.   Drug Use:  reports no history of drug use.    Family History:     Family History   Problem Relation Age of Onset    No Known Problems Mother          Physical Exam:     Vitals:  BP (!) 155/97   Pulse 78   Temp 98.4 °F (36.9 °C)   Resp 16   Ht 5' 8\" (1.727 m)   Wt 275 lb 6.4 oz (124.9 kg)   SpO2 97%   BMI 41.87 kg/m²   Temp (24hrs), Av.4 °F (36.9 °C), Min:98.4 °F (36.9 °C), Max:98.4 °F (36.9 °C)          General appearance - alert, well appearing, and in no acute distress  Mental status - oriented to person, place, and time with normal affect  Head - normocephalic and atraumatic  Eyes - pupils equal and reactive, extraocular eye movements intact, conjunctiva clear  Ears - hearing appears to be intact  Nose - no drainage noted  Mouth - mucous membranes moist  Neck - supple, no carotid bruits, thyroid not palpable  Chest - clear to auscultation, normal effort  Heart - normal rate, regular rhythm, no murmur  Abdomen - soft, upper abdomen tender, nondistended, bowel sounds present all four quadrants, no masses, hepatomegaly or splenomegaly  Neurological - normal speech, no focal findings or movement disorder noted, cranial nerves II through XII grossly intact  Extremities - peripheral pulses palpable, no pedal edema or calf pain with palpation  Skin - no gross lesions, rashes, or induration noted        Data:     Labs:    Hematology:  Recent Labs     03/15/22  0629   WBC 10.6   RBC 4.63   HGB 13.5   HCT 41.0   MCV 88.6   MCH 29.2   MCHC 32.9   RDW 12.0      MPV 9.9     Chemistry:  Recent Labs     03/15/22  0629      K 4.0      CO2 23   GLUCOSE 112*   BUN 18   CREATININE 0.82   ANIONGAP 13   LABGLOM >60   GFRAA >60   CALCIUM 9.3     Recent Labs     03/15/22  0629   PROT 6.7   LABALBU 4.3   *   *   ALKPHOS 156*   BILITOT 1.91*   BILIDIR 1.30*   LIPASE 27       No results found for: INR, PROTIME    Lab Results   Component Value Date/Time    SPECIAL NOT REPORTED 2018 03:25 PM     Lab Results   Component Value Date/Time    CULTURE NORMAL SKIN AMARI (A) 12/17/2015 04:45 PM    CULTURE  12/17/2015 04:45 PM     [facility] [address] [facility], [address] [place] [address] [phone]       No results found for: POCPH, PHART, PH, POCPCO2, FRZ3VTD, PCO2, POCPO2, PO2ART, PO2, POCHCO3, YVX7LUY, HCO3, NBEA, PBEA, BEART, BE, THGBART, THB, SZP4JNX, GFHF7RNZ, V4WWUWKJ, O2SAT, FIO2    Radiology:    US GALLBLADDER RUQ    Result Date: 3/15/2022  1. Cholelithiasis/gallbladder sludge. If there is clinical concern for acute cholecystitis, nuclear medicine HIDA scan should be performed. 2. Enlarged common bile duct measuring 6 mm. Further evaluation with MRCP recommended. Correlation with LFTs recommended. RECOMMENDATIONS: Unavailable     MRI ABDOMEN WO CONTRAST MRCP    Result Date: 3/15/2022  Cholelithiasis. No choledocholithiasis Mild signal loss seen in the liver on out of phase images, compatible with fatty infiltration. All radiological studies reviewed                Code Status:  Full Code    Electronically signed by Sim Essex, MD on 3/15/2022 at 8:40 PM     Copy sent to Dr. Sheryl David DO    This note was created with the assistance of a speech-recognition program.  Although the intention is to generate a document that actually reflects the content of the visit, no guarantees can be provided that every mistake has been identified and corrected by editing. Note was updated later by me after  physical examination and  completion of the assessment.

## 2022-03-16 NOTE — PLAN OF CARE
Pt not seen she is in shower, to have cholecystectomy today. Dr Harry Schilder updated . Sharad High, [place] - CNP

## 2022-03-16 NOTE — OP NOTE
Operative Note      Patient: Awais Fair  YOB: 1980  MRN: [id number]    Date of Procedure: 3/16/2022    Pre-Op Diagnosis: DX GALLSTONES, cholecystitis    Post-Op Diagnosis: Same       Procedure(s):  CHOLECYSTECTOMY LAPAROSCOPIC    Surgeon(s):  Avtar Gardner DO    Assistant:   First Assistant: Major Winkler    Anesthesia: General, half percent Marcaine with epinephrine local infiltration    Estimated Blood Loss (mL): Minimal    Complications: None    Specimens:   ID Type Source Tests Collected by Time Destination   A : GALLBLADDER Tissue Gallbladder SURGICAL PATHOLOGY Avtar Gardner DO 3/16/2022 0279        Implants:  None      Drains:   NG/OG/NJ/NE Tube Orogastric 16 fr Center mouth (Active)       Findings: Cholelithiasis/cholecystitis    Detailed Description of Procedure:   After the abdomen was prepped and draped in usual sterile fashion, a 5 mm infraumbilical skin crease incision was made and the Veress needle was introduced into the abdominal cavity 2 clicks seen on the indicator. Position was confirmed using hanging drop test and the abdomen was then insufflated until was diffusely tympanic and the Veress needle was withdrawn and 5 mm trocar and laparoscope were introduced in the abdominal cavity using Optiview technique. Abdominal viscera were inspected and found to be free of any injury and the patient was placed in steep reverse Trendelenburg position and a 1 cm incision was made in the upper midline and an 11 mm trocar was introduced through this under direct visualization. Gallbladder was noted to be moderately distended and 2 5 m ports were placed in the midclavicular and anterior axillary line respectively 4 cm below the costal margin under direct position through 5 mm transverse incisions and the gallbladder is retracted at the fundus and at the infundibulum and decompressed of 40 mL of thick bile.   Cystic duct and artery were then dissected out clearly and a critical view of safety was obtained using blunt dissection. Cystic duct and artery were then each doubly clipped proximally and distally and transected and the gallbladder was dissected from the liver bed using blunt dissection and electrocautery for hemostasis until it was freed from its distal attachments with good hemostasis in the bed of the dissection. Gallbladder was removed through the upper midline port using Endopouch and handed off for pathologic examination. Hemostasis was then reinspected and found to be complete and clips were found to be in correct position. Upper midline fascia was reapproximated using 0 Vicryl and port closure device under direct visualization and wounds were then anesthetized using half percent Marcaine with epinephrine. Skin was closed using septic with sutures of 4-0 Monocryl and Steri-Strips were then placed. Patient tolerated procedure well and all sponge needle and instrument counts were reported correct at the end of the case and patient taken recovery room in satisfactory condition.     Electronically signed by Hamida Araujo DO on 3/16/2022 at 9:51 AM

## 2022-03-16 NOTE — PROGRESS NOTES
Pt discharged to home with all personal belongings, copy of AVS, instructions for follow up visits, and two medications from [facility]. Pt left unit ambulatory with her parents per pt request.  Pt left hospital grounds via private auto driven by her parents.     Electronically signed by Madhav Juarez RN on 3/16/2022 at 2:50 PM

## 2022-03-16 NOTE — PROGRESS NOTES
Patient weight is between 101-149kg. For prophylaxis with Enoxaparin, Pharmacy adjusted the dose to account for the patient's increased body weight in accordance with hospital approved protocol. The dose has been changed to 30mg BID. Please contact pharmacy with any concerns @ [phone]. Thank you.      Daniel Potter, [facility]  3/15/2022 9:39 PM

## 2022-03-16 NOTE — PLAN OF CARE
Problem: Pain:  Goal: Pain level will decrease  Description: Pain level will decrease  Outcome: Ongoing  Note: Pain level assessment complete.    Patient educated on pain scale and control interventions  PRN pain medication given per patient request  Patient instructed to call out with new onset of pain or unrelieved pain    Goal: Control of acute pain  Description: Control of acute pain  Outcome: Ongoing  Goal: Control of chronic pain  Description: Control of chronic pain  Outcome: Ongoing     Problem: Fluid Volume:  Goal: Ability to achieve a balanced intake and output will improve  Description: Ability to achieve a balanced intake and output will improve  Outcome: Ongoing     Problem: Physical Regulation:  Goal: Ability to maintain clinical measurements within normal limits will improve  Description: Ability to maintain clinical measurements within normal limits will improve  Outcome: Ongoing  Goal: Will show no signs and symptoms of electrolyte imbalance  Description: Will show no signs and symptoms of electrolyte imbalance  Outcome: Ongoing

## 2022-03-16 NOTE — PROGRESS NOTES
General Surgery:  Daily Progress Note              PATIENT NAME: Boby Cruz     TODAY'S DATE: 3/16/2022   CC: abdominal pain    SUBJECTIVE:     Pt seen and examined at bedside. No acute events overnight. No pain or nausea, emesis this AM. NPO for surgery today. OBJECTIVE:   VITALS:  /60   Pulse 61   Temp 96 °F (35.6 °C) (Oral)   Resp 14   Ht 5' 8\" (1.727 m)   Wt 272 lb 8 oz (123.6 kg)   SpO2 97%   BMI 41.43 kg/m²      INTAKE/OUTPUT:      Intake/Output Summary (Last 24 hours) at 3/16/2022 4367  Last data filed at 3/16/2022 3423  Gross per 24 hour   Intake 28.01 ml   Output    Net 28.01 ml       PHYSICAL EXAM:  General Appearance:  awake, alert, oriented, in no acute distress  HEENT:  Normocephalic, atraumatic, mucus membranes moist   Skin:  Skin color, texture, turgor normal. No rashes or lesions. Lungs:  Normal effort on RA, no respiratory distress, no accessory muscle use   Heart:  Regular rate and rhythm   Abdomen: Soft, nontender, nondistended   Extremities: Extremities warm to touch, pink, with no edema.       Data:  CBC:   Lab Results   Component Value Date    WBC 8.3 03/16/2022    RBC 4.33 03/16/2022    HGB 12.9 03/16/2022    HCT 39.2 03/16/2022    MCV 90.5 03/16/2022    MCH 29.8 03/16/2022    MCHC 32.9 03/16/2022    RDW 12.2 03/16/2022     03/16/2022    MPV 10.2 03/16/2022     CMP:    Lab Results   Component Value Date     03/16/2022    K 4.2 03/16/2022     03/16/2022    CO2 24 03/16/2022    BUN 13 03/16/2022    CREATININE 0.94 03/16/2022    GFRAA >60 03/16/2022    LABGLOM >60 03/16/2022    GLUCOSE 85 03/16/2022    PROT 6.1 03/16/2022    LABALBU 3.9 03/16/2022    CALCIUM 9.2 03/16/2022    BILITOT 1.22 03/16/2022    ALKPHOS 163 03/16/2022     03/16/2022     03/16/2022       ASSESSMENT:  Active Hospital Problems    Diagnosis Date Noted    Elevated LFTs [R79.89] 03/15/2022    Acute cholecystitis [K81.0] 03/15/2022       39 y.o. female with symptomatic cholelithiasis     Plan:  1. Continue medical mgmt and supportive care per primary  2. Diet: NPO for surgery   3. IV abx: Zosyn  4. TBili downtrending, AST/ALT/Alk phos wnl. Trend HFP. 5. GI consulted, appreciate recommendations   6.  Plan for laparoscopic cholecystectomy this morning       Starlett Pace, DO  General Surgery PGY-3

## 2022-03-17 LAB — SURGICAL PATHOLOGY REPORT: NORMAL

## 2022-03-17 NOTE — DISCHARGE SUMMARY
Inpatient Discharge Summary    BRIEF OVERVIEW  Admitting Provider: Judith Gant MD  Discharge Provider: No att. providers found  Primary Care Physician: Erma Aguilar [phone]     Admission Date: 3/15/2022     Discharge Date: 3/16/2022    Admission Diagnosis/Reason for Hospitalization:    Calculus of gallbladder with acute cholecystitis and obstruction [K80.01]  Elevated LFTs [R79.89]  Acute cholecystitis [K81.0]      Primary Discharge Diagnosis  Cholelithiasis/cholecystitis      Secondary Discharge Diagnosis  Elevated LFTs, morbid obesity    Discharge Disposition: [place] [address]    Presenting Problem/History of Present Illness  Calculus of gallbladder with acute cholecystitis and obstruction [K80.01]  Elevated LFTs [R79.89]  Acute cholecystitis [K81.0]      Hospital Course  Morbidly obese 71-year-old white female with epigastric pain elevated LFTs underwent ultrasound and MRCP which demonstrated cholelithiasis and no evidence of choledocholithiasis. Patient had laparoscopic cholecystectomy performed and discharged home in stable condition.     Operative Procedures Performed  Procedure(s):  CHOLECYSTECTOMY LAPAROSCOPIC      Consults: GI      Physical Exam at Discharge  Discharge Condition: good  Pulse: 62  Resp: 14  BP: 125/69  Temp: 98.7 °F (37.1 °C)  Weight: 272 lb 8 oz (123.6 kg)  /69   Pulse 62   Temp 98.7 °F (37.1 °C) (Oral)   Resp 14   Ht 5' 8\" (1.727 m)   Wt 272 lb 8 oz (123.6 kg)   SpO2 97%   BMI 41.43 kg/m²     General Appearance:    Alert, cooperative, no distress, appears stated age   Head:    Normocephalic, without obvious abnormality, atraumatic   Eyes:    PERRL, conjunctiva/corneas clear, EOM's intact, fundi     benign, both eyes   Ears:    Normal TM's and external ear canals, both ears   Nose:   Nares normal, septum midline, mucosa normal, no drainage    or sinus tenderness   Throat:   Lips, mucosa, and tongue normal; teeth and gums normal   Neck: Supple, symmetrical, trachea midline, no adenopathy;     thyroid:  no enlargement/tenderness/nodules; no carotid    bruit or JVD   Back:     Symmetric, no curvature, ROM normal, no CVA tenderness   Lungs:     Clear to auscultation bilaterally, respirations unlabored   Chest Wall:    No tenderness or deformity    Heart:    Regular rate and rhythm, S1 and S2 normal, no murmur, rub   or gallop   Breast Exam:    No tenderness, masses, or nipple abnormality   Abdomen:     Soft, non-tender, bowel sounds active all four quadrants,     no masses, no organomegaly, clean laparoscopic incisions   Genitalia:    Normal female without lesion, discharge or tenderness   Rectal:    Normal tone ;guaiac negative stool   Extremities:   Extremities normal, atraumatic, no cyanosis or edema   Pulses:   2+ and symmetric all extremities   Skin:   Skin color, texture, turgor normal, no rashes or lesions   Lymph nodes:   Cervical, supraclavicular, and axillary nodes normal   Neurologic:   CNII-XII intact, normal strength, sensation and reflexes     throughout         Scheduled Outpatient Follow-Up    1. Follow up with Dr Herman Mills  in 10 days     2. Call [phone] to make appointment    3. Address [facility], [address] [place] [address]

## 2022-07-02 ENCOUNTER — APPOINTMENT (OUTPATIENT)
Dept: GENERAL RADIOLOGY | Age: 42
End: 2022-07-02
Payer: COMMERCIAL

## 2022-07-02 ENCOUNTER — HOSPITAL ENCOUNTER (EMERGENCY)
Age: 42
Discharge: HOME | End: 2022-07-02
Attending: EMERGENCY MEDICINE
Payer: COMMERCIAL

## 2022-07-02 VITALS
DIASTOLIC BLOOD PRESSURE: 89 MMHG | WEIGHT: 262 LBS | HEART RATE: 68 BPM | OXYGEN SATURATION: 98 % | BODY MASS INDEX: 39.71 KG/M2 | TEMPERATURE: 98.1 F | HEIGHT: 68 IN | SYSTOLIC BLOOD PRESSURE: 140 MMHG | RESPIRATION RATE: 18 BRPM

## 2022-07-02 DIAGNOSIS — S61.209A AVULSION OF FINGER, INITIAL ENCOUNTER: Primary | ICD-10-CM

## 2022-07-02 PROCEDURE — 90471 IMMUNIZATION ADMIN: CPT | Performed by: EMERGENCY MEDICINE

## 2022-07-02 PROCEDURE — 6360000002 HC RX W HCPCS: Performed by: EMERGENCY MEDICINE

## 2022-07-02 PROCEDURE — 6370000000 HC RX 637 (ALT 250 FOR IP): Performed by: EMERGENCY MEDICINE

## 2022-07-02 PROCEDURE — 2580000003 HC RX 258: Performed by: EMERGENCY MEDICINE

## 2022-07-02 PROCEDURE — 2500000003 HC RX 250 WO HCPCS: Performed by: EMERGENCY MEDICINE

## 2022-07-02 PROCEDURE — 99284 EMERGENCY DEPT VISIT MOD MDM: CPT

## 2022-07-02 PROCEDURE — 73130 X-RAY EXAM OF HAND: CPT

## 2022-07-02 PROCEDURE — 11012 DEB SKIN BONE AT FX SITE: CPT

## 2022-07-02 PROCEDURE — 90715 TDAP VACCINE 7 YRS/> IM: CPT | Performed by: EMERGENCY MEDICINE

## 2022-07-02 RX ORDER — HYDROCODONE BITARTRATE AND ACETAMINOPHEN 5; 325 MG/1; MG/1
1 TABLET ORAL ONCE
Status: COMPLETED | OUTPATIENT
Start: 2022-07-02 | End: 2022-07-02

## 2022-07-02 RX ORDER — TRANEXAMIC ACID 100 MG/ML
1000 INJECTION, SOLUTION INTRAVENOUS ONCE
Status: COMPLETED | OUTPATIENT
Start: 2022-07-02 | End: 2022-07-02

## 2022-07-02 RX ADMIN — TETANUS TOXOID, REDUCED DIPHTHERIA TOXOID AND ACELLULAR PERTUSSIS VACCINE, ADSORBED 0.5 ML: 5; 2.5; 8; 8; 2.5 SUSPENSION INTRAMUSCULAR at 14:42

## 2022-07-02 RX ADMIN — Medication 1 EACH: at 15:17

## 2022-07-02 RX ADMIN — TRANEXAMIC ACID 1000 MG: 1 INJECTION, SOLUTION INTRAVENOUS at 14:59

## 2022-07-02 RX ADMIN — WATER 5 ML: 1 INJECTION INTRAMUSCULAR; INTRAVENOUS; SUBCUTANEOUS at 14:59

## 2022-07-02 RX ADMIN — HYDROCODONE BITARTRATE AND ACETAMINOPHEN 1 TABLET: 5; 325 TABLET ORAL at 14:42

## 2022-07-02 ASSESSMENT — PAIN SCALES - GENERAL
PAINLEVEL_OUTOF10: 3
PAINLEVEL_OUTOF10: 5

## 2022-07-02 ASSESSMENT — PAIN - FUNCTIONAL ASSESSMENT: PAIN_FUNCTIONAL_ASSESSMENT: 0-10

## 2022-07-02 NOTE — ED NOTES
Dr. Thien Kern at bedside. Pt reports she was cutting fabric and cut the tip of her left index finger with a rolling . Avulsion present to distal aspect of left index finger. Active bleeding present from site. Site cleansed with normal saline soak - 2x2 dsg placed and pressure applied by pt.       Lara Cortes RN  07/02/22 4194

## 2022-07-02 NOTE — ED PROVIDER NOTES
[facility]  Emergency Department Encounter     Pt Name: Yung Rabago  MRN: [id number]  [place] 1980  Date of evaluation: 7/2/22  PCP:  Jessica Galvan DO    CHIEF COMPLAINT       Chief Complaint   Patient presents with    Laceration     cut left index finger cutting fabric today       HISTORY OF PRESENT ILLNESS  (Location/Symptom, Timing/Onset, Context/Setting, Quality, Duration, Modifying Factors, Severity.)    Yung Rabago is a 43 y.o. female who presents with wound to left pointer finger. Patient states this happened a couple hours ago when she was cutting fabric and accidentally cut her finger. She has been applying pressure but is still bleeding. Does not know her last tetanus shot was. Denies any other injuries from this incident. Is not on any blood thinners. PAST MEDICAL / SURGICAL / SOCIAL / FAMILY HISTORY    has a past medical history of Thyroid disease. has a past surgical history that includes knee surgery; Nerve Block (01-13-15); Nerve Block (1-20-15); Nerve Block (1/27/15); and Cholecystectomy, laparoscopic (N/A, 3/16/2022).     Social History     Socioeconomic History    Marital status: Single     Spouse name: Not on file    Number of children: Not on file    Years of education: Not on file    Highest education level: Not on file   Occupational History    Not on file   Tobacco Use    Smoking status: Never Smoker    Smokeless tobacco: Never Used   Vaping Use    Vaping Use: Never used   Substance and Sexual Activity    Alcohol use: No     Comment: socially    Drug use: No    Sexual activity: Not Currently   Other Topics Concern    Not on file   Social History Narrative    Not on file     Social Determinants of Health     Financial Resource Strain:     Difficulty of Paying Living Expenses: Not on file   Food Insecurity:     Worried About Running Out of Food in the Last Year: Not on file    Peng of Food in the Last Year: Not on file   Transportation Needs:     Lack of Transportation (Medical): Not on file    Lack of Transportation (Non-Medical): Not on file   Physical Activity:     Days of Exercise per Week: Not on file    Minutes of Exercise per Session: Not on file   Stress:     Feeling of Stress : Not on file   Social Connections:     Frequency of Communication with Friends and Family: Not on file    Frequency of Social Gatherings with Friends and Family: Not on file    Attends Congregation Services: Not on file    Active Member of [address] [place] [address] or Organizations: Not on file    Attends Club or Organization Meetings: Not on file    Marital Status: Not on file   Intimate Partner Violence:     Fear of Current or Ex-Partner: Not on file    Emotionally Abused: Not on file    Physically Abused: Not on file    Sexually Abused: Not on file   Housing Stability:     Unable to Pay for Housing in the Last Year: Not on file    Number of [place] in the Last Year: Not on file    Unstable Housing in the Last Year: Not on file       Family History   Problem Relation Age of Onset    No Known Problems Mother        Allergies:    Patient has no known allergies. Home Medications:  Prior to Admission medications    Medication Sig Start Date End Date Taking?  Authorizing Provider   b complex vitamins capsule Take 1 capsule by mouth daily    Historical Provider, MD   Cholecalciferol (VITAMIN D3) 1.25 MG (69769 UT) CAPS Take 1 capsule by mouth daily    Historical Provider, MD   Omega-3 Fatty Acids (FISH OIL) 1000 MG CAPS Take 1,000 mg by mouth daily    Historical Provider, MD   MAGNESIUM MALATE PO Take 100 mg by mouth daily    Historical Provider, MD   ibuprofen (ADVIL;MOTRIN) 800 MG tablet Take 1 tablet by mouth every 6 hours as needed for Pain 5/2/20 5/9/20  Nataly Jackson PA-C   levothyroxine (SYNTHROID) 125 MCG tablet Take 1 tablet by mouth daily 6/29/18   Historical Provider, MD   [address] [address] 0.25-35 MG-MCG per tablet Take 1 tablet by mouth daily  12/26/14   Historical Provider, MD   Multiple Vitamins-Minerals (MULTI COMPLETE PO) Take 1 tablet by mouth daily. Historical Provider, MD       REVIEW OF SYSTEMS    (2-9 systems for level 4, 10 or more for level 5)    Review of Systems   Musculoskeletal: Positive for myalgias. Skin: Positive for wound. Neurological: Negative for numbness. Hematological: Does not bruise/bleed easily. PHYSICAL EXAM   (up to 7 for level 4, 8 or more for level 5)    VITALS:   Vitals:    07/02/22 1334   BP: (!) 140/89   Pulse: 68   Resp: 18   Temp: 98.1 °F (36.7 °C)   TempSrc: Oral   SpO2: 98%   Weight: 262 lb (118.8 kg)   Height: 5' 8\" (1.727 m)       Physical Exam  Vitals and nursing note reviewed. Constitutional:       General: She is not in acute distress. Appearance: She is well-developed. She is not diaphoretic. HENT:      Head: Normocephalic and atraumatic. Eyes:      Conjunctiva/sclera: Conjunctivae normal.   Cardiovascular:      Rate and Rhythm: Normal rate and regular rhythm. Pulses: Normal pulses. Radial pulses are 2+ on the left side. Pulmonary:      Effort: Pulmonary effort is normal. No respiratory distress. Musculoskeletal:         General: Tenderness present. Normal range of motion. Cervical back: Normal range of motion. Skin:     General: Skin is warm and dry. Findings: Wound present. Comments: Large skin avulsion to tip of left 2nd digit with venous oozing    Neurological:      General: No focal deficit present. Mental Status: She is alert.    Psychiatric:         Behavior: Behavior normal.         DIFFERENTIAL  DIAGNOSIS   PLAN (LABS / IMAGING / EKG):  Orders Placed This Encounter   Procedures    XR HAND LEFT (MIN 3 VIEWS)    Wound care       MEDICATIONS ORDERED:  Orders Placed This Encounter   Medications    tetanus-diphth-acell pertussis (BOOSTRIX) injection 0.5 mL    gelatin adsorbable (GELFOAM) sponge 1 each    HYDROcodone-acetaminophen (NORCO) 5-325 MG per tablet 1 tablet    tranexamic acid (CYKLOKAPRON) injection 1,000 mg    sterile water injection 5 mL     DIAGNOSTIC RESULTS / EMERGENCYDEPARTMENT COURSE / MDM   LABS:  Labs Reviewed - No data to display    RADIOLOGY:  XR HAND LEFT (MIN 3 VIEWS)    Result Date: 7/2/2022  EXAMINATION: THREE XRAY VIEWS OF THE LEFT HAND 7/2/2022 12:02 pm COMPARISON: None. HISTORY: ORDERING SYSTEM PROVIDED HISTORY: avulsion injury L 2nd digit TECHNOLOGIST PROVIDED HISTORY: avulsion injury L 2nd digit Reason for Exam: Avulsion injury left 2nd digit FINDINGS: There is no evidence of acute fracture. There is normal alignment. No acute joint abnormality. No focal osseous lesion. No focal soft tissue abnormality. No acute osseous abnormality. EMERGENCY DEPARTMENT COURSE:  ED Course as of 07/02/22 1524   Sat Jul 02, 2022   1511 XR HAND LEFT (MIN 3 VIEWS) [AO]      ED Course User Index  [AO] Hyacinth Chun DO       MDM  Number of Diagnoses or Management Options     Amount and/or Complexity of Data Reviewed  Tests in the radiology section of CPT®: ordered and reviewed  Review and summarize past medical records: yes  Independent visualization of images, tracings, or specimens: yes    Patient Progress  Patient progress: stable      PROCEDURES:  Wound care    Date/Time: 7/2/2022 3:23 PM  Performed by: Hyacinth Chun DO  Authorized by: Hyacinth Chun DO     Consent:     Consent obtained:  Verbal    Consent given by:  Patient    Risks discussed:  Bleeding, infection, pain and poor cosmetic result    Alternatives discussed:  Alternative treatment and referral  Anesthesia (see MAR for exact dosages):      Anesthesia method:  None  Procedure details:     Indications: open wounds      Wound exploration location: extremity      Wound age (days):  <1    Debridement level: skin, full thickness    Dressing:     Dressing applied:  2x2, Gelfoam small and 4x4    Wrapped with: lizett. Post-procedure details:     Patient tolerance of procedure: Tolerated well, no immediate complications       CONSULTS:  None    CRITICAL CARE:  NONE    FINAL IMPRESSION     1. Avulsion of finger, initial encounter       DISPOSITION / PLAN   DISPOSITION Decision To Discharge 07/02/2022 03:22:24 PM    Evaluation and treatment course in the ED, and plan of care upon discharge was discussed in length with the patient. Patient had no further questions prior to being discharged and was instructed to return to the ED for new or worsening symptoms. Any changes to existing medications or new prescriptions were reviewed with patient and they expressed understanding of how to correctly take their medications and the possible side effects. PATIENT REFERRED TO:  Ale Drake DO  [address] [address] [phone]          [facility]  [address] [facility]  [phone]        DISCHARGE MEDICATIONS:  New Prescriptions    No medications on file       Camila Curry DO  Emergency Medicine Physician    (Please note that portions of this note were completed with a voice recognition program.  Efforts were made to edit the dictations but occasionally words are mis-transcribed.)        Hyacinth Allen 1721,   07/02/22 1524

## 2024-03-21 LAB
CHOLESTEROL, TOTAL: 171 MG/DL
CHOLESTEROL/HDL RATIO: 3.4
HDLC SERPL-MCNC: 51 MG/DL (ref 35–70)
LDL CHOLESTEROL: 87
NONHDLC SERPL-MCNC: NORMAL MG/DL
TRIGL SERPL-MCNC: 163 MG/DL
VLDLC SERPL CALC-MCNC: 33 MG/DL

## 2025-03-17 ENCOUNTER — OFFICE VISIT (OUTPATIENT)
Dept: FAMILY MEDICINE CLINIC | Age: 45
End: 2025-03-17
Payer: COMMERCIAL

## 2025-03-17 ENCOUNTER — HOSPITAL ENCOUNTER (OUTPATIENT)
Age: 45
Setting detail: SPECIMEN
Discharge: HOME | End: 2025-03-17

## 2025-03-17 ENCOUNTER — RESULTS FOLLOW-UP (OUTPATIENT)
Dept: FAMILY MEDICINE CLINIC | Age: 45
End: 2025-03-17

## 2025-03-17 VITALS
WEIGHT: 236 LBS | TEMPERATURE: 96.9 F | HEART RATE: 59 BPM | SYSTOLIC BLOOD PRESSURE: 116 MMHG | BODY MASS INDEX: 35.88 KG/M2 | OXYGEN SATURATION: 98 % | DIASTOLIC BLOOD PRESSURE: 68 MMHG

## 2025-03-17 DIAGNOSIS — Z00.00 ROUTINE GENERAL MEDICAL EXAMINATION AT A HEALTH CARE FACILITY: Primary | ICD-10-CM

## 2025-03-17 DIAGNOSIS — Z12.31 ENCOUNTER FOR SCREENING MAMMOGRAM FOR MALIGNANT NEOPLASM OF BREAST: ICD-10-CM

## 2025-03-17 DIAGNOSIS — Z13.1 SCREENING FOR DIABETES MELLITUS: ICD-10-CM

## 2025-03-17 DIAGNOSIS — E03.9 ACQUIRED HYPOTHYROIDISM: Chronic | ICD-10-CM

## 2025-03-17 DIAGNOSIS — Z13.220 SCREENING FOR LIPOID DISORDERS: ICD-10-CM

## 2025-03-17 DIAGNOSIS — Z00.00 ROUTINE GENERAL MEDICAL EXAMINATION AT A HEALTH CARE FACILITY: ICD-10-CM

## 2025-03-17 DIAGNOSIS — E66.812 CLASS 2 OBESITY WITHOUT SERIOUS COMORBIDITY WITH BODY MASS INDEX (BMI) OF 35.0 TO 35.9 IN ADULT, UNSPECIFIED OBESITY TYPE: ICD-10-CM

## 2025-03-17 LAB
ALBUMIN SERPL-MCNC: 4.2 G/DL (ref 3.5–5.2)
ALBUMIN/GLOB SERPL: 1.6 {RATIO} (ref 1–2.5)
ALP SERPL-CCNC: 69 U/L (ref 35–104)
ALT SERPL-CCNC: 12 U/L (ref 10–35)
ANION GAP SERPL CALCULATED.3IONS-SCNC: 11 MMOL/L (ref 9–16)
AST SERPL-CCNC: 18 U/L (ref 10–35)
BASOPHILS # BLD: 0.05 K/UL (ref 0–0.2)
BASOPHILS NFR BLD: 1 % (ref 0–2)
BILIRUB SERPL-MCNC: 0.6 MG/DL (ref 0–1.2)
BUN SERPL-MCNC: 16 MG/DL (ref 6–20)
CALCIUM SERPL-MCNC: 9.7 MG/DL (ref 8.6–10.4)
CHLORIDE SERPL-SCNC: 102 MMOL/L (ref 98–107)
CHOLEST SERPL-MCNC: 220 MG/DL (ref 0–199)
CHOLESTEROL/HDL RATIO: 3.2
CO2 SERPL-SCNC: 26 MMOL/L (ref 20–31)
CREAT SERPL-MCNC: 0.9 MG/DL (ref 0.6–0.9)
EOSINOPHIL # BLD: 0.04 K/UL (ref 0–0.44)
EOSINOPHILS RELATIVE PERCENT: 0 % (ref 1–4)
ERYTHROCYTE [DISTWIDTH] IN BLOOD BY AUTOMATED COUNT: 12.1 % (ref 11.8–14.4)
EST. AVERAGE GLUCOSE BLD GHB EST-MCNC: 103 MG/DL
GFR, ESTIMATED: 81 ML/MIN/1.73M2
GLUCOSE SERPL-MCNC: 91 MG/DL (ref 74–99)
HBA1C MFR BLD: 5.2 % (ref 4–6)
HCT VFR BLD AUTO: 40.9 % (ref 36.3–47.1)
HDLC SERPL-MCNC: 68 MG/DL
HGB BLD-MCNC: 13.1 G/DL (ref 11.9–15.1)
IMM GRANULOCYTES # BLD AUTO: 0.04 K/UL (ref 0–0.3)
IMM GRANULOCYTES NFR BLD: 0 %
LDLC SERPL CALC-MCNC: 122 MG/DL (ref 0–100)
LYMPHOCYTES NFR BLD: 2.83 K/UL (ref 1.1–3.7)
LYMPHOCYTES RELATIVE PERCENT: 31 % (ref 24–43)
MCH RBC QN AUTO: 28.9 PG (ref 25.2–33.5)
MCHC RBC AUTO-ENTMCNC: 32 G/DL (ref 28.4–34.8)
MCV RBC AUTO: 90.1 FL (ref 82.6–102.9)
MONOCYTES NFR BLD: 0.51 K/UL (ref 0.1–1.2)
MONOCYTES NFR BLD: 6 % (ref 3–12)
NEUTROPHILS NFR BLD: 62 % (ref 36–65)
NEUTS SEG NFR BLD: 5.75 K/UL (ref 1.5–8.1)
NRBC BLD-RTO: 0 PER 100 WBC
PLATELET # BLD AUTO: 275 K/UL (ref 138–453)
PMV BLD AUTO: 10.8 FL (ref 8.1–13.5)
POTASSIUM SERPL-SCNC: 4.6 MMOL/L (ref 3.7–5.3)
PROT SERPL-MCNC: 6.8 G/DL (ref 6.6–8.7)
RBC # BLD AUTO: 4.54 M/UL (ref 3.95–5.11)
SODIUM SERPL-SCNC: 139 MMOL/L (ref 136–145)
T4 FREE SERPL-MCNC: 1.2 NG/DL (ref 0.92–1.68)
TRIGL SERPL-MCNC: 152 MG/DL
TSH SERPL DL<=0.05 MIU/L-ACNC: 1.61 UIU/ML (ref 0.27–4.2)
VLDLC SERPL CALC-MCNC: 30 MG/DL (ref 1–30)
WBC OTHER # BLD: 9.2 K/UL (ref 3.5–11.3)

## 2025-03-17 PROCEDURE — 99386 PREV VISIT NEW AGE 40-64: CPT | Performed by: NURSE PRACTITIONER

## 2025-03-17 RX ORDER — SEMAGLUTIDE 1 MG/.5ML
1 INJECTION, SOLUTION SUBCUTANEOUS
Qty: 2 ML | Refills: 0 | Status: SHIPPED | OUTPATIENT
Start: 2025-03-17

## 2025-03-17 SDOH — HEALTH STABILITY: PHYSICAL HEALTH: ON AVERAGE, HOW MANY MINUTES DO YOU ENGAGE IN EXERCISE AT THIS LEVEL?: 40 MIN

## 2025-03-17 SDOH — ECONOMIC STABILITY: FOOD INSECURITY: WITHIN THE PAST 12 MONTHS, THE FOOD YOU BOUGHT JUST DIDN'T LAST AND YOU DIDN'T HAVE MONEY TO GET MORE.: NEVER TRUE

## 2025-03-17 SDOH — ECONOMIC STABILITY: FOOD INSECURITY: WITHIN THE PAST 12 MONTHS, YOU WORRIED THAT YOUR FOOD WOULD RUN OUT BEFORE YOU GOT MONEY TO BUY MORE.: NEVER TRUE

## 2025-03-17 SDOH — HEALTH STABILITY: PHYSICAL HEALTH: ON AVERAGE, HOW MANY DAYS PER WEEK DO YOU ENGAGE IN MODERATE TO STRENUOUS EXERCISE (LIKE A BRISK WALK)?: 4 DAYS

## 2025-03-17 ASSESSMENT — PATIENT HEALTH QUESTIONNAIRE - PHQ9
SUM OF ALL RESPONSES TO PHQ QUESTIONS 1-9: 0
SUM OF ALL RESPONSES TO PHQ QUESTIONS 1-9: 0
2. FEELING DOWN, DEPRESSED OR HOPELESS: NOT AT ALL
SUM OF ALL RESPONSES TO PHQ QUESTIONS 1-9: 0
SUM OF ALL RESPONSES TO PHQ QUESTIONS 1-9: 0
1. LITTLE INTEREST OR PLEASURE IN DOING THINGS: NOT AT ALL

## 2025-03-17 ASSESSMENT — ENCOUNTER SYMPTOMS
CHEST TIGHTNESS: 0
WHEEZING: 0
SHORTNESS OF BREATH: 0
DIARRHEA: 0
NAUSEA: 0
COLOR CHANGE: 0
SINUS PRESSURE: 0
COUGH: 0
TROUBLE SWALLOWING: 0
CONSTIPATION: 0
ABDOMINAL PAIN: 0
SORE THROAT: 0

## 2025-03-17 NOTE — ASSESSMENT & PLAN NOTE
Controlled?  Update labs for now and continue levothyroxine 125 mcg daily    Orders:    TSH; Future    T4, Free; Future

## 2025-03-17 NOTE — PROGRESS NOTES
Visit Information    Have you changed or started any medications since your last visit including any over-the-counter medicines, vitamins, or herbal medicines? no   Have you stopped taking any of your medications? Is so, why? -  no  Are you having any side effects from any of your medications? - no    Have you seen any other physician or provider since your last visit?  no   Have you had any other diagnostic tests since your last visit?  no   Have you been seen in the emergency room and/or had an admission in a hospital since we last saw you?  no   Have you had your routine dental cleaning in the past 6 months?  no     Do you have an active MyChart account? If no, what is the barrier?  Yes    Patient Care Team:  Neetu New DO as PCP - General (Family Medicine)  Neetu New DO as PCP - Empaneled Provider    Medical History Review  Past Medical, Family, and Social History reviewed and  contribute to the patient presenting condition    Health Maintenance   Topic Date Due    Depression Screen  Never done    HIV screen  Never done    Hepatitis B vaccine (1 of 3 - 19+ 3-dose series) Never done    Varicella vaccine (1 of 2 - 13+ 2-dose series) Never done    Lipids  Never done    Cervical cancer screen  01/05/2021    Breast cancer screen  02/17/2023    COVID-19 Vaccine (4 - 2024-25 season) 09/01/2024    DTaP/Tdap/Td vaccine (2 - Td or Tdap) 07/02/2032    Flu vaccine  Completed    Hepatitis C screen  Completed    Hepatitis A vaccine  Aged Out    Hib vaccine  Aged Out    HPV vaccine  Aged Out    Polio vaccine  Aged Out    Meningococcal (ACWY) vaccine  Aged Out    Meningococcal B vaccine  Aged Out    Pneumococcal 0-49 years Vaccine  Aged Out             
Future    Screening for lipoid disorders       Orders:    Lipid Panel; Future    Screening for diabetes mellitus       Orders:    Hemoglobin A1C; Future    Class 2 obesity without serious comorbidity with body mass index (BMI) of 35.0 to 35.9 in adult, unspecified obesity type   Resume wegovy injections and call in 1 month with update and can increase dose if tolerating  See diet/above    Orders:    Semaglutide-Weight Management (WEGOVY) 1 MG/0.5ML SOAJ SC injection; Inject 1 mg into the skin every 7 days      Return in about 3 months (around 6/18/2025) for PAP.           --Marisel Young, APRN - CNP

## 2025-03-21 ENCOUNTER — TELEPHONE (OUTPATIENT)
Dept: FAMILY MEDICINE CLINIC | Age: 45
End: 2025-03-21

## 2025-03-31 ENCOUNTER — TELEPHONE (OUTPATIENT)
Dept: FAMILY MEDICINE CLINIC | Age: 45
End: 2025-03-31

## 2025-03-31 NOTE — TELEPHONE ENCOUNTER
Called patient to let her know we are unable to access [employer] to upload health screen as instructed on her sheet, no other option on form.  When you call help desk, they tell you to call [employer].  Form is due today, patient is going to call customer service to see what options she has and call us back

## 2025-04-04 RX ORDER — LEVOTHYROXINE SODIUM 125 UG/1
125 TABLET ORAL DAILY
Qty: 30 TABLET | Refills: 3 | Status: SHIPPED | OUTPATIENT
Start: 2025-04-04

## 2025-04-04 NOTE — TELEPHONE ENCOUNTER
Last Visit:  3/17/2025     Next Visit Date:  Future Appointments   Date Time Provider Department Center   6/17/2025 12:30 PM Marisel Young, APRN - CNP [facility]  [facility]

## 2025-06-26 ENCOUNTER — HOSPITAL ENCOUNTER (EMERGENCY)
Age: 45
Discharge: HOME | End: 2025-06-26
Attending: EMERGENCY MEDICINE
Payer: COMMERCIAL

## 2025-06-26 ENCOUNTER — APPOINTMENT (OUTPATIENT)
Dept: GENERAL RADIOLOGY | Age: 45
End: 2025-06-26
Payer: COMMERCIAL

## 2025-06-26 VITALS
SYSTOLIC BLOOD PRESSURE: 128 MMHG | HEART RATE: 63 BPM | TEMPERATURE: 98.2 F | WEIGHT: 255 LBS | BODY MASS INDEX: 38.65 KG/M2 | OXYGEN SATURATION: 100 % | DIASTOLIC BLOOD PRESSURE: 72 MMHG | HEIGHT: 68 IN | RESPIRATION RATE: 18 BRPM

## 2025-06-26 DIAGNOSIS — S39.012A STRAIN OF LUMBAR REGION, INITIAL ENCOUNTER: Primary | ICD-10-CM

## 2025-06-26 PROCEDURE — 73552 X-RAY EXAM OF FEMUR 2/>: CPT

## 2025-06-26 PROCEDURE — 6360000002 HC RX W HCPCS

## 2025-06-26 PROCEDURE — 96372 THER/PROPH/DIAG INJ SC/IM: CPT

## 2025-06-26 PROCEDURE — 99284 EMERGENCY DEPT VISIT MOD MDM: CPT

## 2025-06-26 PROCEDURE — 73502 X-RAY EXAM HIP UNI 2-3 VIEWS: CPT

## 2025-06-26 RX ORDER — MORPHINE SULFATE 4 MG/ML
4 INJECTION, SOLUTION INTRAMUSCULAR; INTRAVENOUS ONCE
Status: COMPLETED | OUTPATIENT
Start: 2025-06-26 | End: 2025-06-26

## 2025-06-26 RX ORDER — IBUPROFEN 600 MG/1
600 TABLET, FILM COATED ORAL EVERY 6 HOURS PRN
Qty: 20 TABLET | Refills: 0 | Status: SHIPPED | OUTPATIENT
Start: 2025-06-26

## 2025-06-26 RX ORDER — HYDROCODONE BITARTRATE AND ACETAMINOPHEN 5; 325 MG/1; MG/1
1 TABLET ORAL EVERY 6 HOURS PRN
Qty: 5 TABLET | Refills: 0 | Status: SHIPPED | OUTPATIENT
Start: 2025-06-26 | End: 2025-06-29

## 2025-06-26 RX ORDER — LIDOCAINE 50 MG/G
1 PATCH TOPICAL DAILY
Qty: 15 PATCH | Refills: 0 | Status: SHIPPED | OUTPATIENT
Start: 2025-06-26 | End: 2025-07-26

## 2025-06-26 RX ORDER — KETOROLAC TROMETHAMINE 30 MG/ML
30 INJECTION, SOLUTION INTRAMUSCULAR; INTRAVENOUS ONCE
Status: COMPLETED | OUTPATIENT
Start: 2025-06-26 | End: 2025-06-26

## 2025-06-26 RX ADMIN — MORPHINE SULFATE 4 MG: 4 INJECTION, SOLUTION INTRAMUSCULAR; INTRAVENOUS at 15:38

## 2025-06-26 RX ADMIN — KETOROLAC TROMETHAMINE 30 MG: 30 INJECTION, SOLUTION INTRAMUSCULAR; INTRAVENOUS at 14:31

## 2025-06-26 ASSESSMENT — ENCOUNTER SYMPTOMS
CHOKING: 0
BACK PAIN: 1
COUGH: 0
ANAL BLEEDING: 0
CHEST TIGHTNESS: 0
ABDOMINAL PAIN: 0

## 2025-06-26 ASSESSMENT — PAIN DESCRIPTION - LOCATION
LOCATION: BACK
LOCATION: BACK;ARM

## 2025-06-26 ASSESSMENT — PAIN SCALES - GENERAL
PAINLEVEL_OUTOF10: 8
PAINLEVEL_OUTOF10: 4

## 2025-06-26 ASSESSMENT — PAIN DESCRIPTION - PAIN TYPE: TYPE: ACUTE PAIN

## 2025-06-26 ASSESSMENT — PAIN DESCRIPTION - ORIENTATION: ORIENTATION: LEFT

## 2025-06-26 ASSESSMENT — PAIN DESCRIPTION - FREQUENCY: FREQUENCY: CONTINUOUS

## 2025-06-26 NOTE — ED NOTES
Pt arrived to ED by EMS, pt fell off of her horse. Per patient horse spooked and went sideways and patient was unable to stay on. She denies hitting her head, she did have a helmet on. Denies headache, or neck pain. States she fell on her left side.

## 2025-06-26 NOTE — DISCHARGE INSTRUCTIONS
Follow up with your primary care physician, Marisel Young, APRN - CNP, in 3-5 days  Reasons to call your doctor or go to the ER: worsening back pain, lower extremity weakness/numbness, urinary or stool incontinence, or any other concerning symptoms.

## 2025-06-28 NOTE — ED PROVIDER NOTES
eMERGENCY dEPARTMENT eNCOUnter   Independent Attestation     Pt Name: Allison Sánchez  MRN: [id number]  Birthdate 1980  Date of evaluation: 6/28/25     Allison Sánchez is a 45 y.o. female with CC: Back Pain (Horse bolted out from underneath her causing her to fall. Reports pain to left sided lower back.)      Based on the medical record the care appears appropriate.  I was personally available for consultation however this patient's care was not discussed with me during their ER visit.     Goyo Munoz MD  Attending Emergency Physician          Goyo Munoz MD  06/28/25 4846    
given the following medications while in the emergency department:  Orders Placed This Encounter   Medications    ketorolac (TORADOL) injection 30 mg    morphine sulfate (PF) injection 4 mg    HYDROcodone-acetaminophen (NORCO) 5-325 MG per tablet     Sig: Take 1 tablet by mouth every 6 hours as needed for Pain (Intended supply: 7 days. Take lowest dose possible to manage pain) for up to 3 days. Max Daily Amount: 4 tablets     Dispense:  5 tablet     Refill:  0    lidocaine (LIDODERM) 5 %     Sig: Place 1 patch onto the skin daily 12 hours on, 12 hours off.     Dispense:  15 patch     Refill:  0    ibuprofen (IBU) 600 MG tablet     Sig: Take 1 tablet by mouth every 6 hours as needed for Pain     Dispense:  20 tablet     Refill:  0     CONSULTS:  None    FINAL IMPRESSION      1. Strain of lumbar region, initial encounter          DISPOSITION/PLAN   DISPOSITION Decision To Discharge 06/26/2025 04:14:57 PM       PATIENT REFERRED TO:  No follow-up provider specified.  DISCHARGE MEDICATIONS:  New Prescriptions    HYDROCODONE-ACETAMINOPHEN (NORCO) 5-325 MG PER TABLET    Take 1 tablet by mouth every 6 hours as needed for Pain (Intended supply: 7 days. Take lowest dose possible to manage pain) for up to 3 days. Max Daily Amount: 4 tablets    IBUPROFEN (IBU) 600 MG TABLET    Take 1 tablet by mouth every 6 hours as needed for Pain    LIDOCAINE (LIDODERM) 5 %    Place 1 patch onto the skin daily 12 hours on, 12 hours off.     John Gay MD  Emergency Resident Physician

## 2025-07-08 ENCOUNTER — OFFICE VISIT (OUTPATIENT)
Dept: FAMILY MEDICINE CLINIC | Age: 45
End: 2025-07-08
Payer: COMMERCIAL

## 2025-07-08 ENCOUNTER — HOSPITAL ENCOUNTER (OUTPATIENT)
Age: 45
Setting detail: SPECIMEN
Discharge: HOME | End: 2025-07-08

## 2025-07-08 VITALS
HEIGHT: 68 IN | HEART RATE: 76 BPM | DIASTOLIC BLOOD PRESSURE: 68 MMHG | OXYGEN SATURATION: 98 % | TEMPERATURE: 97.8 F | SYSTOLIC BLOOD PRESSURE: 118 MMHG | WEIGHT: 259 LBS | BODY MASS INDEX: 39.25 KG/M2

## 2025-07-08 DIAGNOSIS — Z12.11 SCREEN FOR COLON CANCER: ICD-10-CM

## 2025-07-08 DIAGNOSIS — Z12.4 CERVICAL CANCER SCREENING: Primary | ICD-10-CM

## 2025-07-08 PROCEDURE — 99396 PREV VISIT EST AGE 40-64: CPT | Performed by: NURSE PRACTITIONER

## 2025-07-08 NOTE — PROGRESS NOTES
Visit Information    Have you changed or started any medications since your last visit including any over-the-counter medicines, vitamins, or herbal medicines? no   Have you stopped taking any of your medications? Is so, why? -  no  Are you having any side effects from any of your medications? - no    Have you seen any other physician or provider since your last visit?  no   Have you had any other diagnostic tests since your last visit?  no   Have you been seen in the emergency room and/or had an admission in a hospital since we last saw you?  no   Have you had your routine dental cleaning in the past 6 months?  no     Do you have an active MyChart account? If no, what is the barrier?  Yes    Patient Care Team:  Marisel Young APRN - CNP as PCP - General (Family Medicine)  Marisel Young APRN - CNP as PCP - Empaneled Provider    Medical History Review  Past Medical, Family, and Social History reviewed and  contribute to the patient presenting condition    Health Maintenance   Topic Date Due    Varicella vaccine (1 of 2 - 13+ 2-dose series) Never done    Hepatitis B vaccine (1 of 3 - 19+ 3-dose series) Never done    Cervical cancer screen  01/05/2021    Breast cancer screen  02/17/2023    COVID-19 Vaccine (4 - 2024-25 season) 09/01/2024    Colorectal Cancer Screen  06/11/2025    Flu vaccine (1) 08/01/2025    Depression Screen  03/17/2026    Lipids  03/17/2030    DTaP/Tdap/Td vaccine (2 - Td or Tdap) 07/02/2032    Hepatitis C screen  Completed    HIV screen  Completed    Hepatitis A vaccine  Aged Out    Hib vaccine  Aged Out    HPV vaccine  Aged Out    Polio vaccine  Aged Out    Meningococcal (ACWY) vaccine  Aged Out    Meningococcal B vaccine  Aged Out    Pneumococcal 0-49 years Vaccine  Aged Out    Diabetes screen  Discontinued

## 2025-07-08 NOTE — PROGRESS NOTES
[facility]  [address] [place] [address]  [phone]      Allison Sánchez is a 45 y.o. female who presents today for her  medical conditions/complaints as noted below.  Allison Sánchez is c/o of Gynecologic Exam  .    HPI:     HPI  Taking OCP sprintec daily  Has been trying to lety wegovy covered but needed documentation about behavioral modification lifestyle/changes x 6 months. She has brought in her weight watchers membership info for past 6 months. She continues to gain weight despite daily walking with her dog and eating healthy  GYN History:  Menstrual Hx: regular/normal      DOLP: 6/26/25  ST1 Hx:none  Ectopic pregnancy Hx:none  Menarche: 5th grade  Cycle every 30 days  Duration of Cycle: 4 days  Dysmenorrhea:  none  Sexually Active:  no  Dyspareunia: no    [id number]    Past Medical History:   Diagnosis Date    Hypothyroidism 2000    Obesity 1990    Thyroid disease       Past Surgical History:   Procedure Laterality Date    CHOLECYSTECTOMY, LAPAROSCOPIC N/A 3/16/2022    CHOLECYSTECTOMY LAPAROSCOPIC performed by Stef Renner,  at [facility] OR    KNEE SURGERY      NERVE BLOCK  01-13-15    caudal # 1,decadron 10 mg    NERVE BLOCK  1-20-15    caudal epidural steroid block #2 decadron 10 mg    NERVE BLOCK  1/27/15    caudal #3, decadron 10mg     Family History   Problem Relation Age of Onset    No Known Problems Mother     Cancer Father         Throat cancer    Cancer Maternal Grandmother         Breast cancer    Heart Disease Maternal Aunt     Heart Disease Maternal Uncle     Heart Disease Paternal Uncle      Social History     Tobacco Use    Smoking status: Never    Smokeless tobacco: Never   Substance Use Topics    Alcohol use: No     Comment: socially      Current Outpatient Medications   Medication Sig Dispense Refill    lidocaine (LIDODERM) 5 % Place 1 patch onto the skin daily 12 hours on, 12 hours off. 15 patch 0    ibuprofen (IBU) 600 MG tablet Take 1 tablet by mouth

## 2025-07-10 ENCOUNTER — TELEPHONE (OUTPATIENT)
Dept: FAMILY MEDICINE CLINIC | Age: 45
End: 2025-07-10

## 2025-07-10 LAB
HPV I/H RISK 4 DNA CVX QL NAA+PROBE: NOT DETECTED
HPV SAMPLE: NORMAL
HPV, INTERPRETATION: NORMAL
HPV16 DNA CVX QL NAA+PROBE: NOT DETECTED
HPV18 DNA CVX QL NAA+PROBE: NOT DETECTED
SPECIMEN DESCRIPTION: NORMAL

## 2025-07-12 LAB — CYTOLOGY REPORT: NORMAL

## 2025-07-28 ENCOUNTER — HOSPITAL ENCOUNTER (OUTPATIENT)
Dept: MAMMOGRAPHY | Age: 45
Discharge: HOME | End: 2025-07-30
Payer: COMMERCIAL

## 2025-07-28 DIAGNOSIS — Z12.31 ENCOUNTER FOR SCREENING MAMMOGRAM FOR MALIGNANT NEOPLASM OF BREAST: ICD-10-CM

## 2025-07-28 PROCEDURE — 77067 SCR MAMMO BI INCL CAD: CPT

## 2025-07-29 ENCOUNTER — PATIENT MESSAGE (OUTPATIENT)
Dept: FAMILY MEDICINE CLINIC | Age: 45
End: 2025-07-29

## 2025-07-30 NOTE — TELEPHONE ENCOUNTER
Tried calling PA number on card for status update and was sent to static twice with no one picking up

## 2025-08-01 NOTE — TELEPHONE ENCOUNTER
Unable to get any appeal status information due to not knowing last 4 of provider Marisel Young social and call was disconnected after I was informed I wasn't HIPAA authorized.

## 2025-08-06 ENCOUNTER — TELEPHONE (OUTPATIENT)
Dept: FAMILY MEDICINE CLINIC | Age: 45
End: 2025-08-06

## 2025-08-07 ENCOUNTER — TELEPHONE (OUTPATIENT)
Dept: FAMILY MEDICINE CLINIC | Age: 45
End: 2025-08-07

## 2025-08-07 RX ORDER — LEVOTHYROXINE SODIUM 125 UG/1
125 TABLET ORAL DAILY
Qty: 30 TABLET | Refills: 3 | Status: SHIPPED | OUTPATIENT
Start: 2025-08-07

## 2025-08-18 LAB — NONINV COLON CA DNA+OCC BLD SCRN STL QL: NEGATIVE

## (undated) DEVICE — ELECTRODE LAP L36CM PTFE WIRE J HK CLEANCOAT

## (undated) DEVICE — INSUFFLATION NEEDLE TO ESTABLISH PNEUMOPERITONEUM.: Brand: INSUFFLATION NEEDLE

## (undated) DEVICE — 5 MM ASPIRATION/INJECTION NEEDLE, 16 GAUGE, 40 CM LENGTH: Brand: CORE DYNAMICS

## (undated) DEVICE — BLANKET WRM W29.9XL79.1IN UP BODY FORC AIR MISTRAL-AIR

## (undated) DEVICE — NEEDLE HYPO 25GA L1.5IN BLU POLYPR HUB S STL REG BVL STR

## (undated) DEVICE — NEPTUNE E-SEP SMOKE EVACUATION PENCIL, COATED, 70MM BLADE, PUSH BUTTON SWITCH: Brand: NEPTUNE E-SEP

## (undated) DEVICE — DISSECTOR LAP DIA5MM BLNT TIP ENDOPATH

## (undated) DEVICE — Device

## (undated) DEVICE — APPLIER CLP M L L11.4IN DIA10MM ENDOSCP ROT MULT FOR LIG

## (undated) DEVICE — BAG SPEC LAP H6IN DIA3IN 250ML 10 12MM CANN ATTCH MEM WIRE

## (undated) DEVICE — KIT,ANTI FOG,W/SPONGE & FLUID,SOFT PACK: Brand: MEDLINE

## (undated) DEVICE — GARMENT,MEDLINE,DVT,INT,CALF,MED, GEN2: Brand: MEDLINE

## (undated) DEVICE — SUTURE SZ 0 27IN 5/8 CIR UR-6  TAPER PT VIOLET ABSRB VICRYL J603H

## (undated) DEVICE — TROCAR: Brand: KII® SLEEVE

## (undated) DEVICE — CONTAINER,SPECIMEN,OR STERILE,4OZ: Brand: MEDLINE

## (undated) DEVICE — SUTURE MCRYL SZ 4-0 L27IN ABSRB UD L19MM PS-2 1/2 CIR PRIM Y426H

## (undated) DEVICE — STRIP,CLOSURE,WOUND,MEDI-STRIP,1/2X4: Brand: MEDLINE

## (undated) DEVICE — TROCAR: Brand: KII FIOS FIRST ENTRY

## (undated) DEVICE — SUTURE VCRL SZ 4-0 L27IN ABSRB UD L19MM PS-2 3/8 CIR PRIM J426H

## (undated) DEVICE — GLOVE SURG SZ 75 CRM LTX FREE POLYISOPRENE POLYMER BEAD ANTI